# Patient Record
Sex: FEMALE | Race: WHITE | Employment: FULL TIME | URBAN - METROPOLITAN AREA
[De-identification: names, ages, dates, MRNs, and addresses within clinical notes are randomized per-mention and may not be internally consistent; named-entity substitution may affect disease eponyms.]

---

## 2017-03-16 ENCOUNTER — EMPLOYEE WELLNESS (OUTPATIENT)
Dept: OTHER | Age: 42
End: 2017-03-16

## 2017-03-16 LAB
CHOLESTEROL/HDL RATIO: 2.7
CHOLESTEROL: 173 MG/DL
GLUCOSE BLD-MCNC: 92 MG/DL (ref 70–99)
HDLC SERPL-MCNC: 64 MG/DL
LDL CHOLESTEROL: 94 MG/DL (ref 0–130)
TRIGL SERPL-MCNC: 73 MG/DL
VLDLC SERPL CALC-MCNC: NORMAL MG/DL (ref 1–30)

## 2017-08-31 ENCOUNTER — HOSPITAL ENCOUNTER (OUTPATIENT)
Dept: WOMENS IMAGING | Age: 42
Discharge: HOME | End: 2017-08-31
Payer: COMMERCIAL

## 2017-08-31 DIAGNOSIS — R92.8 ABNORMAL MAMMOGRAM OF RIGHT BREAST: ICD-10-CM

## 2017-08-31 PROCEDURE — G0206 DX MAMMO INCL CAD UNI: HCPCS

## 2018-02-09 PROBLEM — R10.13 DYSPEPSIA: Status: ACTIVE | Noted: 2018-02-09

## 2018-02-14 ENCOUNTER — ANESTHESIA EVENT (OUTPATIENT)
Dept: OPERATING ROOM | Age: 43
End: 2018-02-14
Payer: COMMERCIAL

## 2018-02-14 ENCOUNTER — ANESTHESIA (OUTPATIENT)
Dept: OPERATING ROOM | Age: 43
End: 2018-02-14
Payer: COMMERCIAL

## 2018-02-14 ENCOUNTER — HOSPITAL ENCOUNTER (OUTPATIENT)
Age: 43
Setting detail: OUTPATIENT SURGERY
Discharge: HOME | End: 2018-02-14
Attending: INTERNAL MEDICINE | Admitting: INTERNAL MEDICINE
Payer: COMMERCIAL

## 2018-02-14 VITALS
DIASTOLIC BLOOD PRESSURE: 89 MMHG | RESPIRATION RATE: 16 BRPM | SYSTOLIC BLOOD PRESSURE: 117 MMHG | OXYGEN SATURATION: 98 % | HEIGHT: 67 IN | BODY MASS INDEX: 21.97 KG/M2 | HEART RATE: 76 BPM | TEMPERATURE: 98.5 F | WEIGHT: 140 LBS

## 2018-02-14 VITALS
DIASTOLIC BLOOD PRESSURE: 73 MMHG | RESPIRATION RATE: 18 BRPM | OXYGEN SATURATION: 98 % | SYSTOLIC BLOOD PRESSURE: 95 MMHG

## 2018-02-14 PROCEDURE — 2500000003 HC RX 250 WO HCPCS: Performed by: NURSE ANESTHETIST, CERTIFIED REGISTERED

## 2018-02-14 PROCEDURE — 3700000000 HC ANESTHESIA ATTENDED CARE: Performed by: INTERNAL MEDICINE

## 2018-02-14 PROCEDURE — 2580000003 HC RX 258: Performed by: INTERNAL MEDICINE

## 2018-02-14 PROCEDURE — 43239 EGD BIOPSY SINGLE/MULTIPLE: CPT | Performed by: INTERNAL MEDICINE

## 2018-02-14 PROCEDURE — 88305 TISSUE EXAM BY PATHOLOGIST: CPT

## 2018-02-14 PROCEDURE — 7100000010 HC PHASE II RECOVERY - FIRST 15 MIN: Performed by: INTERNAL MEDICINE

## 2018-02-14 PROCEDURE — 7100000011 HC PHASE II RECOVERY - ADDTL 15 MIN: Performed by: INTERNAL MEDICINE

## 2018-02-14 PROCEDURE — 3609012400 HC EGD TRANSORAL BIOPSY SINGLE/MULTIPLE: Performed by: INTERNAL MEDICINE

## 2018-02-14 PROCEDURE — 6360000002 HC RX W HCPCS: Performed by: NURSE ANESTHETIST, CERTIFIED REGISTERED

## 2018-02-14 RX ORDER — SODIUM CHLORIDE, SODIUM LACTATE, POTASSIUM CHLORIDE, CALCIUM CHLORIDE 600; 310; 30; 20 MG/100ML; MG/100ML; MG/100ML; MG/100ML
INJECTION, SOLUTION INTRAVENOUS CONTINUOUS
Status: DISCONTINUED | OUTPATIENT
Start: 2018-02-14 | End: 2018-02-14 | Stop reason: HOSPADM

## 2018-02-14 RX ORDER — LIDOCAINE HYDROCHLORIDE 20 MG/ML
INJECTION, SOLUTION INFILTRATION; PERINEURAL PRN
Status: DISCONTINUED | OUTPATIENT
Start: 2018-02-14 | End: 2018-02-14 | Stop reason: SDUPTHER

## 2018-02-14 RX ORDER — PROPOFOL 10 MG/ML
INJECTION, EMULSION INTRAVENOUS PRN
Status: DISCONTINUED | OUTPATIENT
Start: 2018-02-14 | End: 2018-02-14 | Stop reason: SDUPTHER

## 2018-02-14 RX ADMIN — PROPOFOL 30 MG: 10 INJECTION, EMULSION INTRAVENOUS at 11:11

## 2018-02-14 RX ADMIN — SODIUM CHLORIDE, POTASSIUM CHLORIDE, SODIUM LACTATE AND CALCIUM CHLORIDE: 600; 310; 30; 20 INJECTION, SOLUTION INTRAVENOUS at 09:26

## 2018-02-14 RX ADMIN — LIDOCAINE HYDROCHLORIDE 100 MG: 20 INJECTION, SOLUTION INFILTRATION; PERINEURAL at 11:05

## 2018-02-14 RX ADMIN — PROPOFOL 30 MG: 10 INJECTION, EMULSION INTRAVENOUS at 11:07

## 2018-02-14 RX ADMIN — PROPOFOL 80 MG: 10 INJECTION, EMULSION INTRAVENOUS at 11:05

## 2018-02-14 ASSESSMENT — PAIN SCALES - GENERAL
PAINLEVEL_OUTOF10: 0

## 2018-02-14 ASSESSMENT — LIFESTYLE VARIABLES: SMOKING_STATUS: 0

## 2018-02-14 ASSESSMENT — PAIN - FUNCTIONAL ASSESSMENT: PAIN_FUNCTIONAL_ASSESSMENT: 0-10
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
## 2018-02-15 LAB — SURGICAL PATHOLOGY REPORT: NORMAL

## 2018-02-20 ENCOUNTER — TELEPHONE (OUTPATIENT)
Dept: GASTROENTEROLOGY | Age: 43
End: 2018-02-20

## 2018-03-01 ENCOUNTER — TELEPHONE (OUTPATIENT)
Dept: GASTROENTEROLOGY | Age: 43
End: 2018-03-01

## 2018-03-02 NOTE — TELEPHONE ENCOUNTER
I have no personal knowledge of either of these physicians, however, meeting with one of them to consider options would be reasonable. We can fill out whatever documentation is necessary for her insurance.

## 2018-03-07 NOTE — TELEPHONE ENCOUNTER
I informed Charles Sandoval that we have faxed the form and all records to [facility] to see about approval for her to see one of the surgeons at [facility]. (the doctors are affiliated with [facility])

## 2018-03-20 VITALS — WEIGHT: 138 LBS | BODY MASS INDEX: 21.61 KG/M2

## 2018-03-27 ENCOUNTER — TELEPHONE (OUTPATIENT)
Dept: GASTROENTEROLOGY | Age: 43
End: 2018-03-27

## 2018-03-27 DIAGNOSIS — K21.9 GASTROESOPHAGEAL REFLUX DISEASE WITHOUT ESOPHAGITIS: Primary | ICD-10-CM

## 2018-04-03 ENCOUNTER — EMPLOYEE WELLNESS (OUTPATIENT)
Dept: OTHER | Age: 43
End: 2018-04-03

## 2018-04-03 LAB
CHOLESTEROL/HDL RATIO: 2.7
CHOLESTEROL: 191 MG/DL
GLUCOSE BLD-MCNC: 88 MG/DL (ref 70–99)
HDLC SERPL-MCNC: 71 MG/DL
LDL CHOLESTEROL: 106 MG/DL (ref 0–130)
PATIENT FASTING?: YES
TRIGL SERPL-MCNC: 72 MG/DL
VLDLC SERPL CALC-MCNC: NORMAL MG/DL (ref 1–30)

## 2018-04-05 ENCOUNTER — OFFICE VISIT (OUTPATIENT)
Dept: SURGERY | Age: 43
End: 2018-04-05
Payer: COMMERCIAL

## 2018-04-05 VITALS
BODY MASS INDEX: 21.72 KG/M2 | HEART RATE: 73 BPM | HEIGHT: 67 IN | WEIGHT: 138.4 LBS | SYSTOLIC BLOOD PRESSURE: 118 MMHG | DIASTOLIC BLOOD PRESSURE: 77 MMHG

## 2018-04-05 DIAGNOSIS — K21.00 GASTROESOPHAGEAL REFLUX DISEASE WITH ESOPHAGITIS: ICD-10-CM

## 2018-04-05 DIAGNOSIS — R10.13 DYSPEPSIA: Primary | ICD-10-CM

## 2018-04-05 PROCEDURE — 99203 OFFICE O/P NEW LOW 30 MIN: CPT | Performed by: SURGERY

## 2018-04-05 PROCEDURE — 1036F TOBACCO NON-USER: CPT | Performed by: SURGERY

## 2018-04-05 PROCEDURE — G8427 DOCREV CUR MEDS BY ELIG CLIN: HCPCS | Performed by: SURGERY

## 2018-04-05 PROCEDURE — G8420 CALC BMI NORM PARAMETERS: HCPCS | Performed by: SURGERY

## 2018-04-09 PROBLEM — K21.00 GASTROESOPHAGEAL REFLUX DISEASE WITH ESOPHAGITIS: Status: ACTIVE | Noted: 2018-04-09

## 2018-04-10 VITALS — WEIGHT: 138 LBS | BODY MASS INDEX: 21.61 KG/M2

## 2018-04-19 ENCOUNTER — ANESTHESIA EVENT (OUTPATIENT)
Dept: ENDOSCOPY | Age: 43
End: 2018-04-19
Payer: COMMERCIAL

## 2018-04-19 ENCOUNTER — ANESTHESIA (OUTPATIENT)
Dept: ENDOSCOPY | Age: 43
End: 2018-04-19
Payer: COMMERCIAL

## 2018-04-19 ENCOUNTER — HOSPITAL ENCOUNTER (OUTPATIENT)
Age: 43
Setting detail: OUTPATIENT SURGERY
Discharge: HOME | End: 2018-04-19
Attending: INTERNAL MEDICINE | Admitting: INTERNAL MEDICINE
Payer: COMMERCIAL

## 2018-04-19 VITALS
OXYGEN SATURATION: 98 % | RESPIRATION RATE: 17 BRPM | SYSTOLIC BLOOD PRESSURE: 93 MMHG | DIASTOLIC BLOOD PRESSURE: 58 MMHG

## 2018-04-19 VITALS
SYSTOLIC BLOOD PRESSURE: 119 MMHG | HEIGHT: 67 IN | BODY MASS INDEX: 21.97 KG/M2 | RESPIRATION RATE: 20 BRPM | TEMPERATURE: 98.4 F | HEART RATE: 71 BPM | DIASTOLIC BLOOD PRESSURE: 82 MMHG | WEIGHT: 140 LBS | OXYGEN SATURATION: 100 %

## 2018-04-19 PROCEDURE — 2580000003 HC RX 258: Performed by: INTERNAL MEDICINE

## 2018-04-19 PROCEDURE — 2500000003 HC RX 250 WO HCPCS: Performed by: NURSE ANESTHETIST, CERTIFIED REGISTERED

## 2018-04-19 PROCEDURE — 7100000010 HC PHASE II RECOVERY - FIRST 15 MIN: Performed by: INTERNAL MEDICINE

## 2018-04-19 PROCEDURE — 88305 TISSUE EXAM BY PATHOLOGIST: CPT

## 2018-04-19 PROCEDURE — 3700000000 HC ANESTHESIA ATTENDED CARE: Performed by: INTERNAL MEDICINE

## 2018-04-19 PROCEDURE — 2580000003 HC RX 258: Performed by: NURSE ANESTHETIST, CERTIFIED REGISTERED

## 2018-04-19 PROCEDURE — 7100000011 HC PHASE II RECOVERY - ADDTL 15 MIN: Performed by: INTERNAL MEDICINE

## 2018-04-19 PROCEDURE — 84703 CHORIONIC GONADOTROPIN ASSAY: CPT

## 2018-04-19 PROCEDURE — 6360000002 HC RX W HCPCS: Performed by: NURSE ANESTHETIST, CERTIFIED REGISTERED

## 2018-04-19 PROCEDURE — 3700000001 HC ADD 15 MINUTES (ANESTHESIA): Performed by: INTERNAL MEDICINE

## 2018-04-19 PROCEDURE — 2780000010 HC IMPLANT OTHER: Performed by: INTERNAL MEDICINE

## 2018-04-19 PROCEDURE — 3609012400 HC EGD TRANSORAL BIOPSY SINGLE/MULTIPLE: Performed by: INTERNAL MEDICINE

## 2018-04-19 PROCEDURE — 3609019000 HC EGD CAPSULE ENDOSCOPY: Performed by: INTERNAL MEDICINE

## 2018-04-19 RX ORDER — PROPOFOL 10 MG/ML
INJECTION, EMULSION INTRAVENOUS PRN
Status: DISCONTINUED | OUTPATIENT
Start: 2018-04-19 | End: 2018-04-19 | Stop reason: SDUPTHER

## 2018-04-19 RX ORDER — SODIUM CHLORIDE 9 MG/ML
INJECTION, SOLUTION INTRAVENOUS CONTINUOUS
Status: DISCONTINUED | OUTPATIENT
Start: 2018-04-19 | End: 2018-04-19 | Stop reason: HOSPADM

## 2018-04-19 RX ORDER — SODIUM CHLORIDE 9 MG/ML
INJECTION, SOLUTION INTRAVENOUS CONTINUOUS PRN
Status: DISCONTINUED | OUTPATIENT
Start: 2018-04-19 | End: 2018-04-19 | Stop reason: SDUPTHER

## 2018-04-19 RX ORDER — LIDOCAINE HYDROCHLORIDE 10 MG/ML
INJECTION, SOLUTION INFILTRATION; PERINEURAL PRN
Status: DISCONTINUED | OUTPATIENT
Start: 2018-04-19 | End: 2018-04-19 | Stop reason: SDUPTHER

## 2018-04-19 RX ADMIN — PROPOFOL 200 MG: 10 INJECTION, EMULSION INTRAVENOUS at 11:28

## 2018-04-19 RX ADMIN — SODIUM CHLORIDE: 9 INJECTION, SOLUTION INTRAVENOUS at 11:25

## 2018-04-19 RX ADMIN — PROPOFOL 50 MG: 10 INJECTION, EMULSION INTRAVENOUS at 11:37

## 2018-04-19 RX ADMIN — PROPOFOL 50 MG: 10 INJECTION, EMULSION INTRAVENOUS at 11:31

## 2018-04-19 RX ADMIN — PROPOFOL 50 MG: 10 INJECTION, EMULSION INTRAVENOUS at 11:41

## 2018-04-19 RX ADMIN — SODIUM CHLORIDE: 9 INJECTION, SOLUTION INTRAVENOUS at 10:15

## 2018-04-19 RX ADMIN — PROPOFOL 50 MG: 10 INJECTION, EMULSION INTRAVENOUS at 11:34

## 2018-04-19 RX ADMIN — LIDOCAINE HYDROCHLORIDE 30 MG: 10 INJECTION, SOLUTION INFILTRATION; PERINEURAL at 11:25

## 2018-04-19 ASSESSMENT — PAIN - FUNCTIONAL ASSESSMENT: PAIN_FUNCTIONAL_ASSESSMENT: 0-10

## 2018-04-19 ASSESSMENT — PAIN SCALES - GENERAL
PAINLEVEL_OUTOF10: 0
PAINLEVEL_OUTOF10: 0

## 2018-04-19 ASSESSMENT — PAIN SCALES - WONG BAKER: WONGBAKER_NUMERICALRESPONSE: 0

## 2018-04-20 LAB — HCG, PREGNANCY URINE (POC): NEGATIVE

## 2018-04-21 LAB — SURGICAL PATHOLOGY REPORT: NORMAL

## 2018-05-17 ENCOUNTER — OFFICE VISIT (OUTPATIENT)
Dept: SURGERY | Age: 43
End: 2018-05-17
Payer: COMMERCIAL

## 2018-05-17 VITALS
WEIGHT: 136 LBS | HEIGHT: 67 IN | DIASTOLIC BLOOD PRESSURE: 83 MMHG | SYSTOLIC BLOOD PRESSURE: 127 MMHG | BODY MASS INDEX: 21.35 KG/M2 | HEART RATE: 85 BPM

## 2018-05-17 DIAGNOSIS — K21.00 HIATAL HERNIA WITH GASTROESOPHAGEAL REFLUX DISEASE AND ESOPHAGITIS: Primary | ICD-10-CM

## 2018-05-17 DIAGNOSIS — K44.9 HIATAL HERNIA WITH GASTROESOPHAGEAL REFLUX DISEASE AND ESOPHAGITIS: Primary | ICD-10-CM

## 2018-05-17 DIAGNOSIS — R10.13 EPIGASTRIC PAIN: ICD-10-CM

## 2018-05-17 PROCEDURE — 99213 OFFICE O/P EST LOW 20 MIN: CPT | Performed by: SURGERY
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
## 2018-05-18 ENCOUNTER — TELEPHONE (OUTPATIENT)
Dept: SURGERY | Age: 43
End: 2018-05-18

## 2018-05-21 ENCOUNTER — HOSPITAL ENCOUNTER (OUTPATIENT)
Dept: ULTRASOUND IMAGING | Age: 43
Discharge: HOME | End: 2018-05-23
Payer: COMMERCIAL

## 2018-05-21 ENCOUNTER — HOSPITAL ENCOUNTER (OUTPATIENT)
Dept: PHYSICAL THERAPY | Age: 43
Setting detail: THERAPIES SERIES
Discharge: HOME | End: 2018-05-21
Payer: COMMERCIAL

## 2018-05-21 DIAGNOSIS — R10.13 EPIGASTRIC PAIN: ICD-10-CM

## 2018-05-21 PROCEDURE — 76705 ECHO EXAM OF ABDOMEN: CPT

## 2018-05-21 PROCEDURE — 97760 ORTHOTIC MGMT&TRAING 1ST ENC: CPT

## 2018-05-21 PROCEDURE — L3020 FOOT LONGITUD/METATARSAL SUP: HCPCS

## 2018-05-23 ENCOUNTER — INITIAL CONSULT (OUTPATIENT)
Dept: VASCULAR SURGERY | Age: 43
End: 2018-05-23
Payer: COMMERCIAL

## 2018-05-23 VITALS
DIASTOLIC BLOOD PRESSURE: 75 MMHG | BODY MASS INDEX: 21.3 KG/M2 | RESPIRATION RATE: 17 BRPM | HEART RATE: 62 BPM | SYSTOLIC BLOOD PRESSURE: 115 MMHG | WEIGHT: 136 LBS

## 2018-05-23 DIAGNOSIS — I83.891 VARICOSE VEINS OF LEG WITH EDEMA, RIGHT: Primary | ICD-10-CM

## 2018-05-23 PROCEDURE — G8427 DOCREV CUR MEDS BY ELIG CLIN: HCPCS | Performed by: INTERNAL MEDICINE

## 2018-05-23 PROCEDURE — G8420 CALC BMI NORM PARAMETERS: HCPCS | Performed by: INTERNAL MEDICINE

## 2018-05-23 PROCEDURE — 1036F TOBACCO NON-USER: CPT | Performed by: INTERNAL MEDICINE

## 2018-05-23 PROCEDURE — 99203 OFFICE O/P NEW LOW 30 MIN: CPT | Performed by: INTERNAL MEDICINE

## 2018-06-06 ENCOUNTER — HOSPITAL ENCOUNTER (OUTPATIENT)
Dept: VASCULAR LAB | Age: 43
Discharge: HOME | End: 2018-06-08
Payer: COMMERCIAL

## 2018-06-06 DIAGNOSIS — I83.891 VARICOSE VEINS OF LEG WITH EDEMA, RIGHT: ICD-10-CM

## 2018-06-06 PROCEDURE — 93971 EXTREMITY STUDY: CPT

## 2018-06-11 ENCOUNTER — HOSPITAL ENCOUNTER (OUTPATIENT)
Dept: PHYSICAL THERAPY | Age: 43
Setting detail: THERAPIES SERIES
Discharge: HOME | End: 2018-06-11
Payer: COMMERCIAL

## 2018-07-24 ENCOUNTER — HOSPITAL ENCOUNTER (OUTPATIENT)
Age: 43
Setting detail: SPECIMEN
Discharge: HOME | End: 2018-07-24
Payer: COMMERCIAL

## 2018-07-24 ENCOUNTER — OFFICE VISIT (OUTPATIENT)
Dept: PRIMARY CARE CLINIC | Age: 43
End: 2018-07-24
Payer: COMMERCIAL

## 2018-07-24 VITALS
DIASTOLIC BLOOD PRESSURE: 74 MMHG | RESPIRATION RATE: 18 BRPM | BODY MASS INDEX: 21.38 KG/M2 | HEART RATE: 82 BPM | SYSTOLIC BLOOD PRESSURE: 128 MMHG | TEMPERATURE: 98.8 F | WEIGHT: 136.5 LBS

## 2018-07-24 DIAGNOSIS — L03.116 CELLULITIS OF LEFT LOWER EXTREMITY: Primary | ICD-10-CM

## 2018-07-24 DIAGNOSIS — Z86.718 HISTORY OF DVT OF LOWER EXTREMITY: ICD-10-CM

## 2018-07-24 DIAGNOSIS — T63.691A MARINE ANIMAL STING, ACCIDENTAL OR UNINTENTIONAL, INITIAL ENCOUNTER: ICD-10-CM

## 2018-07-24 PROCEDURE — 87205 SMEAR GRAM STAIN: CPT

## 2018-07-24 PROCEDURE — 99213 OFFICE O/P EST LOW 20 MIN: CPT | Performed by: NURSE PRACTITIONER

## 2018-07-24 PROCEDURE — 87070 CULTURE OTHR SPECIMN AEROBIC: CPT

## 2018-07-24 RX ORDER — MUPIROCIN CALCIUM 20 MG/G
CREAM TOPICAL
Qty: 1 TUBE | Refills: 0 | Status: SHIPPED | OUTPATIENT
Start: 2018-07-24 | End: 2018-07-25

## 2018-07-24 RX ORDER — CEPHALEXIN 500 MG/1
500 CAPSULE ORAL 4 TIMES DAILY
Qty: 28 CAPSULE | Refills: 0 | Status: SHIPPED | OUTPATIENT
Start: 2018-07-24 | End: 2018-12-26

## 2018-07-24 NOTE — PROGRESS NOTES
[facility] PHYSICIANS  [facility]  [address] [address]  Dept: [phone]  Dept Fax: [phone]    Ladan Rodriguez is a 43 y.o. female who presents to the [facility] in Care today for her medical conditions/complaints as noted below. Ladan Rodriguez is c/o of Abrasion (left thigh)      HPI:     70-year-old female presents to the walk-in clinic for complaints of abrasion to her left thigh while on vacation in [place] 10 days ago. Patient verbalizes she has been putting a loose bandage on it noticing a small amount of yellowish drainage. Patient verbalizes she is up-to-date on her tetanus vaccination. Patient verbalizes she has a bruise to her left thigh. Patient has a history of a DVT. Patient denies left thigh swelling, or pain. Also verbalizes she was stung by a stingray to her left inner ankle. Other   Chronicity: Abrasion: Fell approx 10 days ago while on vacation fell off sand ledge and was stung by stingray. Abrasion on left thigh with yellowish drainage and area on left foot rddened with dark center. Associated symptoms include a rash. Pertinent negatives include no fever or headaches. Nothing aggravates the symptoms. She has tried nothing for the symptoms. Past Medical History:   Diagnosis Date    Acute superficial venous thrombosis of lower extremity     postpartum    Environmental allergies     GERD (gastroesophageal reflux disease)     History of varicose veins     Reflux esophagitis         Current Outpatient Prescriptions   Medication Sig Dispense Refill    mupirocin (BACTROBAN) 2 % ointment Apply 3 times daily.  1 Tube 0    cephALEXin (KEFLEX) 500 MG capsule Take 1 capsule by mouth 4 times daily for 7 days 28 capsule 0    celecoxib (CELEBREX) 200 MG capsule Take 1 capsule by mouth daily 30 capsule 3    omeprazole (PRILOSEC) 40 MG delayed release capsule Take 1 capsule by mouth 2 times daily 180 capsule 3     No current facility-administered medications for this visit. No Known Allergies    Subjective:      Review of Systems   Constitutional: Negative. Negative for activity change, appetite change and fever. Respiratory: Negative. Cardiovascular: Negative. Musculoskeletal: Negative. Skin: Positive for rash and wound. Left thigh, left inner ankle   Neurological: Negative. Negative for headaches. Objective:     Physical Exam   Constitutional: She is oriented to person, place, and time. Vital signs are normal. She appears well-developed and well-nourished. She is cooperative. Non-toxic appearance. She does not appear ill. No distress. Appears well hydrated and non toxic. Sitting upright in chair without distress. Respirations are regular, non labored and quiet. HENT:   Head: Normocephalic and atraumatic. Nose: Nose normal.   Mouth/Throat: Oropharynx is clear and moist.   Eyes: EOM are normal. Pupils are equal, round, and reactive to light. Neck: Normal range of motion. Neck supple. Cardiovascular: Normal rate, regular rhythm, normal heart sounds and intact distal pulses. Exam reveals no gallop and no friction rub. No murmur heard. Pulses:       Radial pulses are 2+ on the left side. Dorsalis pedis pulses are 2+ on the left side. Posterior tibial pulses are 2+ on the left side. Pulmonary/Chest: Effort normal and breath sounds normal. No respiratory distress. She has no decreased breath sounds. She has no wheezes. She has no rhonchi. She has no rales. Musculoskeletal: Normal range of motion. Neurological: She is alert and oriented to person, place, and time. She exhibits normal muscle tone. Skin: Skin is warm and dry. Abrasion and bruising noted. No rash noted. There is erythema. Abrasion to left thigh no induration, no fluctuance   Psychiatric: She has a normal mood and affect.  Her speech is normal and behavior is normal. Judgment and thought content normal.   Nursing note and vitals reviewed. /74 (Site: Left Arm, Position: Sitting, Cuff Size: Medium Adult)   Pulse 82   Temp 98.8 °F (37.1 °C) (Temporal)   Resp 18   Wt 136 lb 8 oz (61.9 kg)   BMI 21.38 kg/m²     Assessment:      Diagnosis Orders   1. Cellulitis of left lower extremity  Wound Culture    cephALEXin (KEFLEX) 500 MG capsule    mupirocin (BACTROBAN) 2 % ointment    DISCONTINUED: mupirocin (BACTROBAN) 2 % cream   2. Marine animal sting, accidental or unintentional, initial encounter  mupirocin (BACTROBAN) 2 % ointment   3. History of DVT of lower extremity         Plan:   1. Cellulitis to the left lateral thigh: Culture obtained. Will send a prescription for Keflex and mupirocin. No signs of abscess. No need for MRSA coverage today in office. Informed patient to keep area clean and dry. 2.  Stingray accident: Apply mupirocin topically. Return to office with any signs of infection. 3. Bruising to left thigh, due to patient's history of a DVT Doppler study offered to patient. Patient refuses Doppler scan today. Instructed patient if she has any worsening of symptoms signs of a DVT she needs to see her PCP immediately. Patient verbalizes understanding. Discussed exam, POCT findings, plan of care (including prescriptive and supportive as listed below) and follow-up at length with patient and or Patient. Reviewed all prescribed and recommended medications, administration and side effects. Encouraged to return to [address] [place] [address] for no improvement and or worsening of symptoms. To ER or call 911 if any difficulty breathing, shortness of breath, inability to swallow, hives or temp greater than 103 degrees. Questions answered. They verbalized understanding and agreement. Return in about 2 days (around 7/26/2018) for with PCP.     Orders Placed This Encounter   Medications    cephALEXin (KEFLEX) 500 MG capsule     Sig: Take 1 capsule by mouth 4 times daily for

## 2018-07-25 ENCOUNTER — TELEPHONE (OUTPATIENT)
Dept: PRIMARY CARE CLINIC | Age: 43
End: 2018-07-25

## 2018-07-25 ASSESSMENT — ENCOUNTER SYMPTOMS: RESPIRATORY NEGATIVE: 1

## 2018-07-25 NOTE — TELEPHONE ENCOUNTER
----- Message from LOPEZ Garces CNP sent at 7/25/2018  4:44 PM EDT -----  No growth. Please relate to patient/parent. Thank you.    Manual Danger

## 2018-07-26 LAB
CULTURE: NO GROWTH
DIRECT EXAM: NORMAL
DIRECT EXAM: NORMAL
Lab: NORMAL
SPECIMEN DESCRIPTION: NORMAL
STATUS: NORMAL

## 2018-09-12 ENCOUNTER — INITIAL CONSULT (OUTPATIENT)
Dept: BARIATRICS/WEIGHT MGMT | Age: 43
End: 2018-09-12
Payer: COMMERCIAL

## 2018-09-12 VITALS
BODY MASS INDEX: 21.52 KG/M2 | SYSTOLIC BLOOD PRESSURE: 112 MMHG | HEIGHT: 67 IN | WEIGHT: 137.1 LBS | RESPIRATION RATE: 20 BRPM | HEART RATE: 74 BPM | DIASTOLIC BLOOD PRESSURE: 72 MMHG

## 2018-09-12 DIAGNOSIS — K21.00 GASTROESOPHAGEAL REFLUX DISEASE WITH ESOPHAGITIS: Primary | ICD-10-CM

## 2018-09-12 PROCEDURE — 99204 OFFICE O/P NEW MOD 45 MIN: CPT | Performed by: SURGERY

## 2018-09-19 ENCOUNTER — TELEPHONE (OUTPATIENT)
Dept: BARIATRICS/WEIGHT MGMT | Age: 43
End: 2018-09-19

## 2018-10-26 ENCOUNTER — ANESTHESIA EVENT (OUTPATIENT)
Dept: OPERATING ROOM | Age: 43
DRG: 328 | End: 2018-10-26
Payer: COMMERCIAL

## 2018-10-29 ENCOUNTER — HOSPITAL ENCOUNTER (OUTPATIENT)
Age: 43
Setting detail: OBSERVATION
Discharge: HOME | DRG: 328 | End: 2018-10-29
Attending: SURGERY | Admitting: SURGERY
Payer: COMMERCIAL

## 2018-10-29 ENCOUNTER — ANESTHESIA (OUTPATIENT)
Dept: OPERATING ROOM | Age: 43
DRG: 328 | End: 2018-10-29
Payer: COMMERCIAL

## 2018-10-29 VITALS
BODY MASS INDEX: 21.5 KG/M2 | HEART RATE: 62 BPM | TEMPERATURE: 98.1 F | RESPIRATION RATE: 18 BRPM | SYSTOLIC BLOOD PRESSURE: 110 MMHG | OXYGEN SATURATION: 99 % | DIASTOLIC BLOOD PRESSURE: 81 MMHG | HEIGHT: 67 IN | WEIGHT: 137 LBS

## 2018-10-29 VITALS — DIASTOLIC BLOOD PRESSURE: 72 MMHG | SYSTOLIC BLOOD PRESSURE: 109 MMHG | TEMPERATURE: 98.3 F | OXYGEN SATURATION: 98 %

## 2018-10-29 DIAGNOSIS — G89.18 PAIN ASSOCIATED WITH SURGICAL PROCEDURE: Primary | ICD-10-CM

## 2018-10-29 PROBLEM — K21.9 HIATAL HERNIA WITH GERD: Status: ACTIVE | Noted: 2018-10-29

## 2018-10-29 PROBLEM — K44.9 HIATAL HERNIA WITH GERD: Status: ACTIVE | Noted: 2018-10-29

## 2018-10-29 LAB
GFR NON-AFRICAN AMERICAN: >60 ML/MIN
GFR SERPL CREATININE-BSD FRML MDRD: >60 ML/MIN
GFR SERPL CREATININE-BSD FRML MDRD: NORMAL ML/MIN/{1.73_M2}
GLUCOSE BLD-MCNC: 88 MG/DL (ref 74–100)
POC CHLORIDE: 107 MMOL/L (ref 98–107)
POC CREATININE: 0.96 MG/DL (ref 0.51–1.19)
POC INR: 1
POC IONIZED CALCIUM: 1.21 MMOL/L (ref 1.15–1.33)
POC LACTIC ACID: 0.61 MMOL/L (ref 0.56–1.39)
POC POTASSIUM: 5 MMOL/L (ref 3.5–4.5)
POC SODIUM: 142 MMOL/L (ref 138–146)
PROTHROMBIN TIME, POC: 11.5 SEC (ref 10.4–14.2)

## 2018-10-29 PROCEDURE — G0378 HOSPITAL OBSERVATION PER HR: HCPCS

## 2018-10-29 PROCEDURE — S2900 ROBOTIC SURGICAL SYSTEM: HCPCS | Performed by: SURGERY

## 2018-10-29 PROCEDURE — 82435 ASSAY OF BLOOD CHLORIDE: CPT

## 2018-10-29 PROCEDURE — 2580000003 HC RX 258: Performed by: ANESTHESIOLOGY

## 2018-10-29 PROCEDURE — 2500000003 HC RX 250 WO HCPCS: Performed by: SURGERY

## 2018-10-29 PROCEDURE — 2580000003 HC RX 258: Performed by: SURGERY

## 2018-10-29 PROCEDURE — 3600000009 HC SURGERY ROBOT BASE: Performed by: SURGERY

## 2018-10-29 PROCEDURE — 6360000002 HC RX W HCPCS: Performed by: SURGERY

## 2018-10-29 PROCEDURE — 2709999900 HC NON-CHARGEABLE SUPPLY: Performed by: SURGERY

## 2018-10-29 PROCEDURE — 43281 LAP PARAESOPHAG HERN REPAIR: CPT | Performed by: SURGERY

## 2018-10-29 PROCEDURE — 85610 PROTHROMBIN TIME: CPT

## 2018-10-29 PROCEDURE — 7100000001 HC PACU RECOVERY - ADDTL 15 MIN: Performed by: SURGERY

## 2018-10-29 PROCEDURE — 84295 ASSAY OF SERUM SODIUM: CPT

## 2018-10-29 PROCEDURE — 6370000000 HC RX 637 (ALT 250 FOR IP): Performed by: ANESTHESIOLOGY

## 2018-10-29 PROCEDURE — 82565 ASSAY OF CREATININE: CPT

## 2018-10-29 PROCEDURE — 82947 ASSAY GLUCOSE BLOOD QUANT: CPT

## 2018-10-29 PROCEDURE — 3700000001 HC ADD 15 MINUTES (ANESTHESIA): Performed by: SURGERY

## 2018-10-29 PROCEDURE — C1768 GRAFT, VASCULAR: HCPCS | Performed by: SURGERY

## 2018-10-29 PROCEDURE — 82330 ASSAY OF CALCIUM: CPT

## 2018-10-29 PROCEDURE — 7100000000 HC PACU RECOVERY - FIRST 15 MIN: Performed by: SURGERY

## 2018-10-29 PROCEDURE — 83605 ASSAY OF LACTIC ACID: CPT

## 2018-10-29 PROCEDURE — 1200000000 HC SEMI PRIVATE

## 2018-10-29 PROCEDURE — 2500000003 HC RX 250 WO HCPCS: Performed by: NURSE ANESTHETIST, CERTIFIED REGISTERED

## 2018-10-29 PROCEDURE — 6360000002 HC RX W HCPCS: Performed by: NURSE ANESTHETIST, CERTIFIED REGISTERED

## 2018-10-29 PROCEDURE — S0028 INJECTION, FAMOTIDINE, 20 MG: HCPCS | Performed by: SURGERY

## 2018-10-29 PROCEDURE — 3600000019 HC SURGERY ROBOT ADDTL 15MIN: Performed by: SURGERY

## 2018-10-29 PROCEDURE — 3700000000 HC ANESTHESIA ATTENDED CARE: Performed by: SURGERY

## 2018-10-29 PROCEDURE — 84132 ASSAY OF SERUM POTASSIUM: CPT

## 2018-10-29 DEVICE — BARD® PTFE FELT, 2.5 CM X 15.2 CM
Type: IMPLANTABLE DEVICE | Status: FUNCTIONAL
Brand: BARD® PTFE FELT

## 2018-10-29 RX ORDER — SODIUM CHLORIDE 9 MG/ML
INJECTION, SOLUTION INTRAVENOUS CONTINUOUS
Status: DISCONTINUED | OUTPATIENT
Start: 2018-10-29 | End: 2018-10-29 | Stop reason: HOSPADM

## 2018-10-29 RX ORDER — SODIUM CHLORIDE 0.9 % (FLUSH) 0.9 %
10 SYRINGE (ML) INJECTION PRN
Status: DISCONTINUED | OUTPATIENT
Start: 2018-10-29 | End: 2018-10-29 | Stop reason: HOSPADM

## 2018-10-29 RX ORDER — GLYCOPYRROLATE 1 MG/5 ML
SYRINGE (ML) INTRAVENOUS PRN
Status: DISCONTINUED | OUTPATIENT
Start: 2018-10-29 | End: 2018-10-29 | Stop reason: SDUPTHER

## 2018-10-29 RX ORDER — MIDAZOLAM HYDROCHLORIDE 1 MG/ML
1 INJECTION INTRAMUSCULAR; INTRAVENOUS
Status: CANCELLED | OUTPATIENT
Start: 2018-10-29 | End: 2018-10-29

## 2018-10-29 RX ORDER — FENTANYL CITRATE 50 UG/ML
25 INJECTION, SOLUTION INTRAMUSCULAR; INTRAVENOUS EVERY 5 MIN PRN
Status: DISCONTINUED | OUTPATIENT
Start: 2018-10-29 | End: 2018-10-29 | Stop reason: HOSPADM

## 2018-10-29 RX ORDER — HEPARIN SODIUM 5000 [USP'U]/ML
5000 INJECTION, SOLUTION INTRAVENOUS; SUBCUTANEOUS ONCE
Status: COMPLETED | OUTPATIENT
Start: 2018-10-29 | End: 2018-10-29

## 2018-10-29 RX ORDER — OXYCODONE HCL 5 MG/5 ML
5 SOLUTION, ORAL ORAL EVERY 4 HOURS PRN
Qty: 140 ML | Refills: 0 | Status: SHIPPED | OUTPATIENT
Start: 2018-10-29 | End: 2018-12-26

## 2018-10-29 RX ORDER — BUPIVACAINE HYDROCHLORIDE AND EPINEPHRINE 5; 5 MG/ML; UG/ML
INJECTION, SOLUTION EPIDURAL; INTRACAUDAL; PERINEURAL PRN
Status: DISCONTINUED | OUTPATIENT
Start: 2018-10-29 | End: 2018-10-29 | Stop reason: HOSPADM

## 2018-10-29 RX ORDER — PROMETHAZINE HYDROCHLORIDE 25 MG/1
25 SUPPOSITORY RECTAL EVERY 6 HOURS PRN
Status: DISCONTINUED | OUTPATIENT
Start: 2018-10-29 | End: 2018-10-29 | Stop reason: HOSPADM

## 2018-10-29 RX ORDER — LIDOCAINE HYDROCHLORIDE 10 MG/ML
INJECTION, SOLUTION EPIDURAL; INFILTRATION; INTRACAUDAL; PERINEURAL PRN
Status: DISCONTINUED | OUTPATIENT
Start: 2018-10-29 | End: 2018-10-29 | Stop reason: SDUPTHER

## 2018-10-29 RX ORDER — DIPHENHYDRAMINE HYDROCHLORIDE 50 MG/ML
INJECTION INTRAMUSCULAR; INTRAVENOUS PRN
Status: DISCONTINUED | OUTPATIENT
Start: 2018-10-29 | End: 2018-10-29 | Stop reason: SDUPTHER

## 2018-10-29 RX ORDER — FENTANYL CITRATE 50 UG/ML
INJECTION, SOLUTION INTRAMUSCULAR; INTRAVENOUS PRN
Status: DISCONTINUED | OUTPATIENT
Start: 2018-10-29 | End: 2018-10-29 | Stop reason: SDUPTHER

## 2018-10-29 RX ORDER — ONDANSETRON 4 MG/1
4 TABLET, ORALLY DISINTEGRATING ORAL EVERY 8 HOURS PRN
Qty: 25 TABLET | Refills: 0 | Status: SHIPPED | OUTPATIENT
Start: 2018-10-29 | End: 2020-02-25 | Stop reason: SDUPTHER

## 2018-10-29 RX ORDER — LABETALOL HYDROCHLORIDE 5 MG/ML
5 INJECTION, SOLUTION INTRAVENOUS EVERY 10 MIN PRN
Status: DISCONTINUED | OUTPATIENT
Start: 2018-10-29 | End: 2018-10-29 | Stop reason: HOSPADM

## 2018-10-29 RX ORDER — KETOROLAC TROMETHAMINE 30 MG/ML
30 INJECTION, SOLUTION INTRAMUSCULAR; INTRAVENOUS EVERY 6 HOURS
Status: DISCONTINUED | OUTPATIENT
Start: 2018-10-29 | End: 2018-10-29 | Stop reason: HOSPADM

## 2018-10-29 RX ORDER — SODIUM CHLORIDE 0.9 % (FLUSH) 0.9 %
10 SYRINGE (ML) INJECTION EVERY 12 HOURS SCHEDULED
Status: DISCONTINUED | OUTPATIENT
Start: 2018-10-29 | End: 2018-10-29 | Stop reason: HOSPADM

## 2018-10-29 RX ORDER — NEOSTIGMINE METHYLSULFATE 5 MG/5 ML
SYRINGE (ML) INTRAVENOUS PRN
Status: DISCONTINUED | OUTPATIENT
Start: 2018-10-29 | End: 2018-10-29 | Stop reason: SDUPTHER

## 2018-10-29 RX ORDER — PROPOFOL 10 MG/ML
INJECTION, EMULSION INTRAVENOUS PRN
Status: DISCONTINUED | OUTPATIENT
Start: 2018-10-29 | End: 2018-10-29 | Stop reason: SDUPTHER

## 2018-10-29 RX ORDER — SODIUM CHLORIDE, SODIUM LACTATE, POTASSIUM CHLORIDE, CALCIUM CHLORIDE 600; 310; 30; 20 MG/100ML; MG/100ML; MG/100ML; MG/100ML
INJECTION, SOLUTION INTRAVENOUS CONTINUOUS
Status: DISCONTINUED | OUTPATIENT
Start: 2018-10-29 | End: 2018-10-29

## 2018-10-29 RX ORDER — ONDANSETRON 2 MG/ML
4 INJECTION INTRAMUSCULAR; INTRAVENOUS
Status: DISCONTINUED | OUTPATIENT
Start: 2018-10-29 | End: 2018-10-29 | Stop reason: HOSPADM

## 2018-10-29 RX ORDER — ONDANSETRON 2 MG/ML
4 INJECTION INTRAMUSCULAR; INTRAVENOUS EVERY 4 HOURS PRN
Status: DISCONTINUED | OUTPATIENT
Start: 2018-10-29 | End: 2018-10-29 | Stop reason: HOSPADM

## 2018-10-29 RX ORDER — SCOLOPAMINE TRANSDERMAL SYSTEM 1 MG/1
1 PATCH, EXTENDED RELEASE TRANSDERMAL ONCE
Status: DISCONTINUED | OUTPATIENT
Start: 2018-10-29 | End: 2018-10-29 | Stop reason: HOSPADM

## 2018-10-29 RX ORDER — OXYCODONE HCL 5 MG/5 ML
5 SOLUTION, ORAL ORAL EVERY 4 HOURS PRN
Status: DISCONTINUED | OUTPATIENT
Start: 2018-10-29 | End: 2018-10-29 | Stop reason: HOSPADM

## 2018-10-29 RX ORDER — ROCURONIUM BROMIDE 10 MG/ML
INJECTION, SOLUTION INTRAVENOUS PRN
Status: DISCONTINUED | OUTPATIENT
Start: 2018-10-29 | End: 2018-10-29 | Stop reason: SDUPTHER

## 2018-10-29 RX ORDER — MAGNESIUM HYDROXIDE 1200 MG/15ML
LIQUID ORAL CONTINUOUS PRN
Status: COMPLETED | OUTPATIENT
Start: 2018-10-29 | End: 2018-10-29

## 2018-10-29 RX ORDER — ONDANSETRON 2 MG/ML
INJECTION INTRAMUSCULAR; INTRAVENOUS PRN
Status: DISCONTINUED | OUTPATIENT
Start: 2018-10-29 | End: 2018-10-29 | Stop reason: SDUPTHER

## 2018-10-29 RX ORDER — ACETAMINOPHEN 10 MG/ML
INJECTION, SOLUTION INTRAVENOUS PRN
Status: DISCONTINUED | OUTPATIENT
Start: 2018-10-29 | End: 2018-10-29 | Stop reason: SDUPTHER

## 2018-10-29 RX ORDER — DEXAMETHASONE SODIUM PHOSPHATE 10 MG/ML
INJECTION INTRAMUSCULAR; INTRAVENOUS PRN
Status: DISCONTINUED | OUTPATIENT
Start: 2018-10-29 | End: 2018-10-29 | Stop reason: SDUPTHER

## 2018-10-29 RX ORDER — LIDOCAINE HYDROCHLORIDE 10 MG/ML
1 INJECTION, SOLUTION EPIDURAL; INFILTRATION; INTRACAUDAL; PERINEURAL
Status: DISCONTINUED | OUTPATIENT
Start: 2018-10-29 | End: 2018-10-29 | Stop reason: HOSPADM

## 2018-10-29 RX ADMIN — ROCURONIUM BROMIDE 10 MG: 10 INJECTION INTRAVENOUS at 08:20

## 2018-10-29 RX ADMIN — DEXAMETHASONE SODIUM PHOSPHATE 10 MG: 10 INJECTION INTRAMUSCULAR; INTRAVENOUS at 07:41

## 2018-10-29 RX ADMIN — FAMOTIDINE 20 MG: 10 INJECTION, SOLUTION INTRAVENOUS at 12:22

## 2018-10-29 RX ADMIN — PHENYLEPHRINE HYDROCHLORIDE 100 MCG: 10 INJECTION INTRAVENOUS at 08:24

## 2018-10-29 RX ADMIN — Medication 2 G: at 07:45

## 2018-10-29 RX ADMIN — FENTANYL CITRATE 50 MCG: 50 INJECTION INTRAMUSCULAR; INTRAVENOUS at 08:42

## 2018-10-29 RX ADMIN — SODIUM CHLORIDE, POTASSIUM CHLORIDE, SODIUM LACTATE AND CALCIUM CHLORIDE: 600; 310; 30; 20 INJECTION, SOLUTION INTRAVENOUS at 08:18

## 2018-10-29 RX ADMIN — KETOROLAC TROMETHAMINE 30 MG: 30 INJECTION, SOLUTION INTRAMUSCULAR at 12:22

## 2018-10-29 RX ADMIN — FENTANYL CITRATE 50 MCG: 50 INJECTION INTRAMUSCULAR; INTRAVENOUS at 08:28

## 2018-10-29 RX ADMIN — HEPARIN SODIUM 5000 UNITS: 5000 INJECTION, SOLUTION INTRAVENOUS; SUBCUTANEOUS at 06:58

## 2018-10-29 RX ADMIN — FENTANYL CITRATE 50 MCG: 50 INJECTION INTRAMUSCULAR; INTRAVENOUS at 07:35

## 2018-10-29 RX ADMIN — Medication 0.6 MG: at 09:04

## 2018-10-29 RX ADMIN — FENTANYL CITRATE 50 MCG: 50 INJECTION INTRAMUSCULAR; INTRAVENOUS at 09:23

## 2018-10-29 RX ADMIN — ONDANSETRON 4 MG: 2 INJECTION, SOLUTION INTRAMUSCULAR; INTRAVENOUS at 09:08

## 2018-10-29 RX ADMIN — ROCURONIUM BROMIDE 50 MG: 10 INJECTION INTRAVENOUS at 07:35

## 2018-10-29 RX ADMIN — FENTANYL CITRATE 50 MCG: 50 INJECTION INTRAMUSCULAR; INTRAVENOUS at 07:55

## 2018-10-29 RX ADMIN — PROPOFOL 150 MG: 10 INJECTION, EMULSION INTRAVENOUS at 07:35

## 2018-10-29 RX ADMIN — PHENYLEPHRINE HYDROCHLORIDE 200 MCG: 10 INJECTION INTRAVENOUS at 08:08

## 2018-10-29 RX ADMIN — SODIUM CHLORIDE: 9 INJECTION, SOLUTION INTRAVENOUS at 10:38

## 2018-10-29 RX ADMIN — LIDOCAINE HYDROCHLORIDE 50 MG: 10 INJECTION, SOLUTION EPIDURAL; INFILTRATION; INTRACAUDAL; PERINEURAL at 07:35

## 2018-10-29 RX ADMIN — ONDANSETRON 4 MG: 2 INJECTION, SOLUTION INTRAMUSCULAR; INTRAVENOUS at 07:41

## 2018-10-29 RX ADMIN — ACETAMINOPHEN 1000 MG: 10 INJECTION, SOLUTION INTRAVENOUS at 07:49

## 2018-10-29 RX ADMIN — SODIUM CHLORIDE, POTASSIUM CHLORIDE, SODIUM LACTATE AND CALCIUM CHLORIDE: 600; 310; 30; 20 INJECTION, SOLUTION INTRAVENOUS at 06:55

## 2018-10-29 RX ADMIN — Medication 3 MG: at 09:04

## 2018-10-29 RX ADMIN — Medication 12.5 MG: at 07:41

## 2018-10-29 RX ADMIN — PHENYLEPHRINE HYDROCHLORIDE 100 MCG: 10 INJECTION INTRAVENOUS at 08:21

## 2018-10-29 RX ADMIN — ROCURONIUM BROMIDE 10 MG: 10 INJECTION INTRAVENOUS at 08:11

## 2018-10-29 ASSESSMENT — PULMONARY FUNCTION TESTS
PIF_VALUE: 29
PIF_VALUE: 5
PIF_VALUE: 27
PIF_VALUE: 15
PIF_VALUE: 37
PIF_VALUE: 25
PIF_VALUE: 20
PIF_VALUE: 32
PIF_VALUE: 14
PIF_VALUE: 15
PIF_VALUE: 0
PIF_VALUE: 17
PIF_VALUE: 29
PIF_VALUE: 25
PIF_VALUE: 27
PIF_VALUE: 26
PIF_VALUE: 14
PIF_VALUE: 20
PIF_VALUE: 21
PIF_VALUE: 1
PIF_VALUE: 14
PIF_VALUE: 15
PIF_VALUE: 23
PIF_VALUE: 28
PIF_VALUE: 28
PIF_VALUE: 14
PIF_VALUE: 28
PIF_VALUE: 31
PIF_VALUE: 5
PIF_VALUE: 20
PIF_VALUE: 28
PIF_VALUE: 4
PIF_VALUE: 30
PIF_VALUE: 21
PIF_VALUE: 29
PIF_VALUE: 30
PIF_VALUE: 2
PIF_VALUE: 28
PIF_VALUE: 26
PIF_VALUE: 14
PIF_VALUE: 27
PIF_VALUE: 28
PIF_VALUE: 20
PIF_VALUE: 26
PIF_VALUE: 33
PIF_VALUE: 11
PIF_VALUE: 20
PIF_VALUE: 19
PIF_VALUE: 30
PIF_VALUE: 23
PIF_VALUE: 27
PIF_VALUE: 24
PIF_VALUE: 23
PIF_VALUE: 16
PIF_VALUE: 29
PIF_VALUE: 27
PIF_VALUE: 30
PIF_VALUE: 28
PIF_VALUE: 19
PIF_VALUE: 29
PIF_VALUE: 23
PIF_VALUE: 28
PIF_VALUE: 1
PIF_VALUE: 1
PIF_VALUE: 27
PIF_VALUE: 30
PIF_VALUE: 15
PIF_VALUE: 31
PIF_VALUE: 19
PIF_VALUE: 18
PIF_VALUE: 29
PIF_VALUE: 26
PIF_VALUE: 31
PIF_VALUE: 20
PIF_VALUE: 15
PIF_VALUE: 30
PIF_VALUE: 14
PIF_VALUE: 30
PIF_VALUE: 1
PIF_VALUE: 31
PIF_VALUE: 19
PIF_VALUE: 14
PIF_VALUE: 14
PIF_VALUE: 26
PIF_VALUE: 28
PIF_VALUE: 13
PIF_VALUE: 20
PIF_VALUE: 30
PIF_VALUE: 28
PIF_VALUE: 14
PIF_VALUE: 24
PIF_VALUE: 27
PIF_VALUE: 26
PIF_VALUE: 20
PIF_VALUE: 23
PIF_VALUE: 9
PIF_VALUE: 23
PIF_VALUE: 22
PIF_VALUE: 29
PIF_VALUE: 21
PIF_VALUE: 30
PIF_VALUE: 14
PIF_VALUE: 31
PIF_VALUE: 6
PIF_VALUE: 4
PIF_VALUE: 26
PIF_VALUE: 19
PIF_VALUE: 30

## 2018-10-29 ASSESSMENT — PAIN SCALES - GENERAL
PAINLEVEL_OUTOF10: 0
PAINLEVEL_OUTOF10: 4
PAINLEVEL_OUTOF10: 0

## 2018-10-29 ASSESSMENT — PAIN - FUNCTIONAL ASSESSMENT: PAIN_FUNCTIONAL_ASSESSMENT: 0-10

## 2018-10-29 NOTE — CARE COORDINATION
Independent  Ability to do shopping:  Independent  Ability to manage finances: Independent  Is patient able to live independently?:  Yes  Hearing and Vision  Care Transitions Interventions         Follow Up  Future Appointments  Date Time Provider Akhil Blackwell   11/7/2018 11:45 AM [facility]   2/4/2019 8:10 AM Nubia Madison MD [facility] [address] [facility]  There are no preventive care reminders to display for this patient.     Maty Henry MA

## 2018-10-29 NOTE — PROGRESS NOTES
CLINICAL PHARMACY NOTE: MEDS TO [address] [place] [address] Drive Select Patient?: Yes  Total # of Prescriptions Filled: 2   The following medications were delivered to the patient:  · ONDANSETRON 4MG  · OXYCODONE 5MG/5ML  Total # of Interventions Completed: 1  Time Spent (min): 60    Additional Documentation:DELIVERED TO ROOM

## 2018-10-30 NOTE — ANESTHESIA POSTPROCEDURE EVALUATION
Department of Anesthesiology  Postprocedure Note    Patient: Marsha Garza  MRN: [id number]  YOB: 1975  Date of evaluation: 10/30/2018  Time:  7:15 AM     Procedure Summary     Date:  10/29/18 Room / Location:  [facility] OR [address] [place] [address] OR    Anesthesia Start:  730 Anesthesia Stop:  4351    Procedure: HERNIA HIATAL LAPAROSCOPIC ROBOTIC WITH NISSEN FUNDOPLICATION (N/A ) Diagnosis:  (HIATAL HERNIA)    Surgeon:  Jefferson Thorne MD Responsible Provider:  Noam Boss MD    Anesthesia Type:  general ASA Status:  3          Anesthesia Type: general    Nneka Phase I: Nneka Score: 10    Nneka Phase II:      Last vitals: Reviewed and per EMR flowsheets.        Anesthesia Post Evaluation   Vital Signs (Current)   Vitals:    10/29/18 1015   BP: 110/81   Pulse: 62   Resp: 18   Temp: 98.1 °F (36.7 °C)   SpO2: 99%     Vital Signs Statistics (for past 48 hrs)     Temp  Av.1 °F (36.7 °C)  Min: 97.4 °F (36.3 °C)   Min taken time: 10/29/18 0754  Max: 98.5 °F (36.9 °C)   Max taken time: 10/29/18 0859  Pulse  Av.8  Min: 58   Min taken time: 10/29/18 1015  Max: 89   Max taken time: 10/29/18 0945  Resp  Av.9  Min: 0   Min taken time: 10/29/18 1485  Max: 23   Max taken time: 10/29/18 1000  BP  Min: 63/49   Min taken time: 10/29/18 1402  Max: 126/74   Max taken time: 10/29/18 0734  SpO2  Av.4 %  Min: 96 %   Min taken time: 10/29/18 0915  Max: 100 %   Max taken time: 10/29/18 0911    BP Readings from Last 3 Encounters:   10/29/18 109/72   10/29/18 110/81   18 112/72       Level of Consciousness:  Awake    Respiratory:  Stable    Airway :   Patent    Oxygen Saturation:  Stable    Cardiovascular:  Stable    Hydration:  Adequate    PONV:  Stable    Post-op Pain:  Adequate analgesia    Post-op Assessment:  No apparent anesthetic complications : subcutaneous emphysema noted: no SOB    Additional Follow-Up / Treatment / Comment:  None

## 2018-11-02 ENCOUNTER — TELEPHONE (OUTPATIENT)
Dept: BARIATRICS/WEIGHT MGMT | Age: 43
End: 2018-11-02

## 2018-11-02 NOTE — TELEPHONE ENCOUNTER
10-29-18 Nissen done  States that she is tolerating liquids and pureed food well.   Would like to advance her diet to [facility]"   She feels that she can tolerate a bit more

## 2018-11-07 ENCOUNTER — OFFICE VISIT (OUTPATIENT)
Dept: BARIATRICS/WEIGHT MGMT | Age: 43
End: 2018-11-07

## 2018-11-07 VITALS
TEMPERATURE: 97.9 F | RESPIRATION RATE: 20 BRPM | BODY MASS INDEX: 20.88 KG/M2 | DIASTOLIC BLOOD PRESSURE: 80 MMHG | HEIGHT: 67 IN | SYSTOLIC BLOOD PRESSURE: 104 MMHG | WEIGHT: 133 LBS | HEART RATE: 77 BPM

## 2018-11-07 DIAGNOSIS — K21.9 HIATAL HERNIA WITH GERD: Primary | ICD-10-CM

## 2018-11-07 DIAGNOSIS — K44.9 HIATAL HERNIA WITH GERD: Primary | ICD-10-CM

## 2018-11-07 PROCEDURE — 99024 POSTOP FOLLOW-UP VISIT: CPT | Performed by: SURGERY

## 2018-11-23 ENCOUNTER — TELEPHONE (OUTPATIENT)
Dept: BARIATRICS/WEIGHT MGMT | Age: 43
End: 2018-11-23

## 2018-11-27 ENCOUNTER — TELEPHONE (OUTPATIENT)
Dept: GASTROENTEROLOGY | Age: 43
End: 2018-11-27

## 2018-11-27 DIAGNOSIS — K21.9 GASTROESOPHAGEAL REFLUX DISEASE, ESOPHAGITIS PRESENCE NOT SPECIFIED: Primary | ICD-10-CM

## 2018-12-06 ENCOUNTER — HOSPITAL ENCOUNTER (OUTPATIENT)
Dept: WOMENS IMAGING | Age: 43
Discharge: HOME | End: 2018-12-08
Payer: COMMERCIAL

## 2018-12-06 DIAGNOSIS — R92.8 ABNORMAL MAMMOGRAM OF BOTH BREASTS: ICD-10-CM

## 2018-12-06 PROCEDURE — G0279 TOMOSYNTHESIS, MAMMO: HCPCS

## 2018-12-19 ENCOUNTER — HOSPITAL ENCOUNTER (OUTPATIENT)
Age: 43
Setting detail: OUTPATIENT SURGERY
Discharge: HOME | End: 2018-12-19
Attending: INTERNAL MEDICINE | Admitting: INTERNAL MEDICINE
Payer: COMMERCIAL

## 2018-12-19 ENCOUNTER — ANESTHESIA EVENT (OUTPATIENT)
Dept: OPERATING ROOM | Age: 43
End: 2018-12-19
Payer: COMMERCIAL

## 2018-12-19 ENCOUNTER — ANESTHESIA (OUTPATIENT)
Dept: OPERATING ROOM | Age: 43
End: 2018-12-19
Payer: COMMERCIAL

## 2018-12-19 VITALS
DIASTOLIC BLOOD PRESSURE: 65 MMHG | SYSTOLIC BLOOD PRESSURE: 93 MMHG | RESPIRATION RATE: 13 BRPM | OXYGEN SATURATION: 97 %

## 2018-12-19 VITALS
TEMPERATURE: 97.3 F | BODY MASS INDEX: 21.19 KG/M2 | WEIGHT: 135 LBS | RESPIRATION RATE: 16 BRPM | DIASTOLIC BLOOD PRESSURE: 80 MMHG | OXYGEN SATURATION: 100 % | HEART RATE: 67 BPM | HEIGHT: 67 IN | SYSTOLIC BLOOD PRESSURE: 107 MMHG

## 2018-12-19 LAB — HCG(URINE) PREGNANCY TEST: NEGATIVE

## 2018-12-19 PROCEDURE — 88305 TISSUE EXAM BY PATHOLOGIST: CPT

## 2018-12-19 PROCEDURE — 6360000002 HC RX W HCPCS: Performed by: NURSE ANESTHETIST, CERTIFIED REGISTERED

## 2018-12-19 PROCEDURE — 2580000003 HC RX 258: Performed by: INTERNAL MEDICINE

## 2018-12-19 PROCEDURE — 84703 CHORIONIC GONADOTROPIN ASSAY: CPT

## 2018-12-19 PROCEDURE — 3609012400 HC EGD TRANSORAL BIOPSY SINGLE/MULTIPLE: Performed by: INTERNAL MEDICINE

## 2018-12-19 PROCEDURE — 3700000001 HC ADD 15 MINUTES (ANESTHESIA): Performed by: INTERNAL MEDICINE

## 2018-12-19 PROCEDURE — 7100000011 HC PHASE II RECOVERY - ADDTL 15 MIN: Performed by: INTERNAL MEDICINE

## 2018-12-19 PROCEDURE — 2709999900 HC NON-CHARGEABLE SUPPLY: Performed by: INTERNAL MEDICINE

## 2018-12-19 PROCEDURE — 2500000003 HC RX 250 WO HCPCS: Performed by: NURSE ANESTHETIST, CERTIFIED REGISTERED

## 2018-12-19 PROCEDURE — 7100000010 HC PHASE II RECOVERY - FIRST 15 MIN: Performed by: INTERNAL MEDICINE

## 2018-12-19 PROCEDURE — 43239 EGD BIOPSY SINGLE/MULTIPLE: CPT | Performed by: INTERNAL MEDICINE

## 2018-12-19 PROCEDURE — 3700000000 HC ANESTHESIA ATTENDED CARE: Performed by: INTERNAL MEDICINE

## 2018-12-19 RX ORDER — PROPOFOL 10 MG/ML
INJECTION, EMULSION INTRAVENOUS PRN
Status: DISCONTINUED | OUTPATIENT
Start: 2018-12-19 | End: 2018-12-19 | Stop reason: SDUPTHER

## 2018-12-19 RX ORDER — LIDOCAINE HYDROCHLORIDE 20 MG/ML
INJECTION, SOLUTION EPIDURAL; INFILTRATION; INTRACAUDAL; PERINEURAL PRN
Status: DISCONTINUED | OUTPATIENT
Start: 2018-12-19 | End: 2018-12-19 | Stop reason: SDUPTHER

## 2018-12-19 RX ORDER — LORATADINE 10 MG/1
10 TABLET ORAL DAILY
COMMUNITY
End: 2019-05-07

## 2018-12-19 RX ORDER — SODIUM CHLORIDE, SODIUM LACTATE, POTASSIUM CHLORIDE, CALCIUM CHLORIDE 600; 310; 30; 20 MG/100ML; MG/100ML; MG/100ML; MG/100ML
INJECTION, SOLUTION INTRAVENOUS CONTINUOUS
Status: DISCONTINUED | OUTPATIENT
Start: 2018-12-19 | End: 2018-12-19 | Stop reason: HOSPADM

## 2018-12-19 RX ORDER — FENTANYL CITRATE 50 UG/ML
INJECTION, SOLUTION INTRAMUSCULAR; INTRAVENOUS PRN
Status: DISCONTINUED | OUTPATIENT
Start: 2018-12-19 | End: 2018-12-19 | Stop reason: SDUPTHER

## 2018-12-19 RX ADMIN — PROPOFOL 20 MG: 10 INJECTION, EMULSION INTRAVENOUS at 09:51

## 2018-12-19 RX ADMIN — PROPOFOL 100 MG: 10 INJECTION, EMULSION INTRAVENOUS at 09:41

## 2018-12-19 RX ADMIN — PROPOFOL 20 MG: 10 INJECTION, EMULSION INTRAVENOUS at 09:45

## 2018-12-19 RX ADMIN — LIDOCAINE HYDROCHLORIDE 3 ML: 20 INJECTION, SOLUTION EPIDURAL; INFILTRATION; INTRACAUDAL at 09:41

## 2018-12-19 RX ADMIN — PROPOFOL 20 MG: 10 INJECTION, EMULSION INTRAVENOUS at 09:43

## 2018-12-19 RX ADMIN — FENTANYL CITRATE 50 MCG: 50 INJECTION INTRAMUSCULAR; INTRAVENOUS at 09:40

## 2018-12-19 RX ADMIN — SODIUM CHLORIDE, POTASSIUM CHLORIDE, SODIUM LACTATE AND CALCIUM CHLORIDE: 600; 310; 30; 20 INJECTION, SOLUTION INTRAVENOUS at 09:07

## 2018-12-19 RX ADMIN — PROPOFOL 20 MG: 10 INJECTION, EMULSION INTRAVENOUS at 09:47

## 2018-12-19 RX ADMIN — PROPOFOL 20 MG: 10 INJECTION, EMULSION INTRAVENOUS at 09:49

## 2018-12-19 ASSESSMENT — PAIN - FUNCTIONAL ASSESSMENT: PAIN_FUNCTIONAL_ASSESSMENT: 0-10

## 2018-12-20 LAB — SURGICAL PATHOLOGY REPORT: NORMAL

## 2019-02-04 PROBLEM — Z00.00 WELLNESS EXAMINATION: Status: ACTIVE | Noted: 2019-02-04

## 2019-03-06 ENCOUNTER — EMPLOYEE WELLNESS (OUTPATIENT)
Dept: OTHER | Age: 44
End: 2019-03-06

## 2019-03-06 PROBLEM — Z00.00 WELLNESS EXAMINATION: Status: RESOLVED | Noted: 2019-02-04 | Resolved: 2019-03-06

## 2019-03-06 LAB
CHOLESTEROL/HDL RATIO: 3.1
CHOLESTEROL: 182 MG/DL
GLUCOSE BLD-MCNC: 91 MG/DL (ref 70–99)
HDLC SERPL-MCNC: 58 MG/DL
LDL CHOLESTEROL: 102 MG/DL (ref 0–130)
PATIENT FASTING?: YES
TRIGL SERPL-MCNC: 110 MG/DL
VLDLC SERPL CALC-MCNC: NORMAL MG/DL (ref 1–30)

## 2019-04-10 ENCOUNTER — TELEPHONE (OUTPATIENT)
Dept: BARIATRICS/WEIGHT MGMT | Age: 44
End: 2019-04-10

## 2019-04-10 NOTE — TELEPHONE ENCOUNTER
Next Visit Date:  Visit date not found    Patient Surgery Date  00/28 Nissen Fundoplication    Type of  Surgery  10/29    Patient calls complaining of  Reflux is improved but has issues with raw vegetables such as cucumbers and broccoli; also meat that has  A lot of gravy or sauce does not go down well; pt concerned if this is normal or is she trying to eat things too soon after her procedure    Onset: 1 month(s) ago  Timing: constant, intermittent  Severity: did not ask    Progression: did not ask    Associated Symptoms: none    Any allergy  To medications     Did not ask    Current  Pharmacy   Did not ask    Patient advised:   If  Symptoms worsen seek treatment at  Emergency Room. You will receive a call back from the office within 24-48 hours.     Is it ok to leave a message if we call back did not ask

## 2019-04-10 NOTE — TELEPHONE ENCOUNTER
All of those things can be problematic. Vegetables showed be steamed.   Some meats may always cause a problem

## 2019-05-06 VITALS — BODY MASS INDEX: 20.52 KG/M2 | WEIGHT: 131 LBS

## 2019-05-07 ENCOUNTER — OFFICE VISIT (OUTPATIENT)
Dept: OBGYN | Age: 44
End: 2019-05-07
Payer: COMMERCIAL

## 2019-05-07 ENCOUNTER — HOSPITAL ENCOUNTER (OUTPATIENT)
Age: 44
Setting detail: SPECIMEN
Discharge: HOME | End: 2019-05-07
Payer: COMMERCIAL

## 2019-05-07 VITALS
WEIGHT: 132.6 LBS | HEIGHT: 67 IN | BODY MASS INDEX: 20.81 KG/M2 | DIASTOLIC BLOOD PRESSURE: 70 MMHG | SYSTOLIC BLOOD PRESSURE: 124 MMHG

## 2019-05-07 DIAGNOSIS — Z01.419 ENCOUNTER FOR ANNUAL ROUTINE GYNECOLOGICAL EXAMINATION: Primary | ICD-10-CM

## 2019-05-07 DIAGNOSIS — Z01.419 ENCOUNTER FOR ANNUAL ROUTINE GYNECOLOGICAL EXAMINATION: ICD-10-CM

## 2019-05-07 PROCEDURE — G0145 SCR C/V CYTO,THINLAYER,RESCR: HCPCS

## 2019-05-07 PROCEDURE — 99396 PREV VISIT EST AGE 40-64: CPT | Performed by: ADVANCED PRACTICE MIDWIFE

## 2019-05-07 ASSESSMENT — ENCOUNTER SYMPTOMS
SORE THROAT: 0
BACK PAIN: 0
SHORTNESS OF BREATH: 0
ABDOMINAL PAIN: 1
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
## 2019-05-13 LAB — CYTOLOGY REPORT: NORMAL

## 2019-10-23 NOTE — ANESTHESIA PRE PROCEDURE
Department of Anesthesiology  Preprocedure Note       Name:  Harvey Holly   Age:  37 y.o.  :  1975                                          MRN:  [id number]         Date:  10/29/2018      Surgeon: Candis Parry):  Ryanne Bianchi MD    Procedure: XI LAPAROSCOPIC ROBOTIC HIATAL HERNIA REPAIR WITH NISSEN FUNDOPLICATION (N/A )  HERNIA HIATAL LAPAROSCOPIC ROBOTIC WITH NISSEN FUNDOPLICATION (N/A )    Medications prior to admission:   Prior to Admission medications    Medication Sig Start Date End Date Taking? Authorizing Provider   oxyCODONE (ROXICODONE) 5 MG/5ML solution Take 5 mLs by mouth every 4 hours as needed for Pain for up to 10 days. . 10/29/18 11/8/18 Yes Diana Mooney, DO   ondansetron (ZOFRAN-ODT) 4 MG disintegrating tablet Take 1 tablet by mouth every 8 hours as needed for Nausea or Vomiting 10/29/18  Yes Diana Mooney, DO   omeprazole (PRILOSEC) 40 MG delayed release capsule Take 1 capsule by mouth 2 times daily 2/3/18  Yes Nayan Dong MD   celecoxib (CELEBREX) 200 MG capsule Take 1 capsule by mouth daily  Patient taking differently: Take 200 mg by mouth daily as needed  18   Nayan Dong MD       Current medications:    Current Facility-Administered Medications   Medication Dose Route Frequency Provider Last Rate Last Dose    lactated ringers infusion   Intravenous Continuous Venecia Salgado  mL/hr at 10/29/18 0655      lidocaine PF 1 % injection 1 mL  1 mL Intradermal Once PRN Venecia Salgado MD        ceFAZolin (ANCEF) 2 g in dextrose 5 % 50 mL IVPB  2 g Intravenous On Call to [address] [place] [address],[address] [facility], MD        scopolamine (TRANSDERM-SCOP) transdermal patch 1 patch  1 patch Transdermal Once Mesha Bustamante MD   1 patch at 10/29/18 0523       Allergies:  No Known Allergies    Problem List:    Patient Active Problem List   Diagnosis Code    Gastroesophageal reflux disease without esophagitis K21.9    Dyspepsia R10.13    Gastroesophageal reflux disease with esophagitis K21.0   
Detail Level: Detailed
Quality 130: Documentation Of Current Medications In The Medical Record: Current Medications Documented
Quality 131: Pain Assessment And Follow-Up: Pain assessment using a standardized tool is documented as negative, no follow-up plan required
Additional Notes: Pain level 0/10

## 2020-01-15 ENCOUNTER — OFFICE VISIT (OUTPATIENT)
Dept: OBGYN | Age: 45
End: 2020-01-15
Payer: COMMERCIAL

## 2020-01-15 VITALS
DIASTOLIC BLOOD PRESSURE: 66 MMHG | WEIGHT: 134 LBS | SYSTOLIC BLOOD PRESSURE: 106 MMHG | BODY MASS INDEX: 21.03 KG/M2 | HEIGHT: 67 IN

## 2020-01-15 PROCEDURE — 99201 PR OFFICE OUTPATIENT NEW 10 MINUTES: CPT | Performed by: OBSTETRICS & GYNECOLOGY

## 2020-01-15 NOTE — PROGRESS NOTES
does have a history of a blood clot in her right calf and so not a candidate for hormonal therapy or Lysteda    No  PT denies fever, chills, nausea and vomiting       Objective: Past test reviewed. Recent normal Pap smear                           Assessment and Plan: We will arrange for an ultrasound exam.  If findings negative consider procedure like NovaSure ablation          Diagnosis Orders   1. Menometrorrhagia               No follow-ups on file. FF: 10 minutes    There are no Patient Instructions on file for this visit. Over 75%of time spent on counseling and care coordination on: see assessment and plan,  She was also counseled on her preventative health maintenance recommendations and follow-up.       Leonel Jones 4:35 PM

## 2020-02-05 ENCOUNTER — OFFICE VISIT (OUTPATIENT)
Dept: OBGYN | Age: 45
End: 2020-02-05
Payer: COMMERCIAL

## 2020-02-05 VITALS
SYSTOLIC BLOOD PRESSURE: 122 MMHG | WEIGHT: 137 LBS | HEIGHT: 67 IN | BODY MASS INDEX: 21.5 KG/M2 | DIASTOLIC BLOOD PRESSURE: 78 MMHG

## 2020-02-05 PROCEDURE — 99213 OFFICE O/P EST LOW 20 MIN: CPT | Performed by: OBSTETRICS & GYNECOLOGY

## 2020-02-05 NOTE — PROGRESS NOTES
ablation. Did review surgical risk of blood loss, infection, small risk of uterine perforation with risk of additional surgery, and failure of the NovaSure. Patient did acknowledge these risk and did sign the surgical consent form.   She will call the office after she is made her final decision regarding this

## 2020-02-24 ENCOUNTER — HOSPITAL ENCOUNTER (OUTPATIENT)
Age: 45
Discharge: HOME | End: 2020-02-24
Payer: COMMERCIAL

## 2020-02-24 LAB
ABO/RH: NORMAL
ABSOLUTE EOS #: 0.07 K/UL (ref 0–0.44)
ABSOLUTE IMMATURE GRANULOCYTE: 0.04 K/UL (ref 0–0.3)
ABSOLUTE LYMPH #: 1.65 K/UL (ref 1.1–3.7)
ABSOLUTE MONO #: 0.55 K/UL (ref 0.1–1.2)
ANTIBODY SCREEN: NEGATIVE
ARM BAND NUMBER: NORMAL
BASOPHILS # BLD: 1 % (ref 0–2)
BASOPHILS ABSOLUTE: 0.04 K/UL (ref 0–0.2)
DIFFERENTIAL TYPE: ABNORMAL
EOSINOPHILS RELATIVE PERCENT: 1 % (ref 1–4)
EXPIRATION DATE: NORMAL
HCG QUALITATIVE: NEGATIVE
HCT VFR BLD CALC: 47.5 % (ref 36.3–47.1)
HEMOGLOBIN: 15.8 G/DL (ref 11.9–15.1)
IMMATURE GRANULOCYTES: 1 %
LYMPHOCYTES # BLD: 26 % (ref 24–43)
MCH RBC QN AUTO: 31.3 PG (ref 25.2–33.5)
MCHC RBC AUTO-ENTMCNC: 33.3 G/DL (ref 28.4–34.8)
MCV RBC AUTO: 94.2 FL (ref 82.6–102.9)
MONOCYTES # BLD: 9 % (ref 3–12)
NRBC AUTOMATED: 0 PER 100 WBC
PDW BLD-RTO: 12.1 % (ref 11.8–14.4)
PLATELET # BLD: 248 K/UL (ref 138–453)
PLATELET ESTIMATE: ABNORMAL
PMV BLD AUTO: 10 FL (ref 8.1–13.5)
RBC # BLD: 5.04 M/UL (ref 3.95–5.11)
RBC # BLD: ABNORMAL 10*6/UL
SEG NEUTROPHILS: 62 % (ref 36–65)
SEGMENTED NEUTROPHILS ABSOLUTE COUNT: 4.11 K/UL (ref 1.5–8.1)
WBC # BLD: 6.5 K/UL (ref 3.5–11.3)
WBC # BLD: ABNORMAL 10*3/UL

## 2020-02-24 PROCEDURE — 85025 COMPLETE CBC W/AUTO DIFF WBC: CPT

## 2020-02-24 PROCEDURE — 86850 RBC ANTIBODY SCREEN: CPT

## 2020-02-24 PROCEDURE — 86901 BLOOD TYPING SEROLOGIC RH(D): CPT

## 2020-02-24 PROCEDURE — 87086 URINE CULTURE/COLONY COUNT: CPT

## 2020-02-24 PROCEDURE — 84703 CHORIONIC GONADOTROPIN ASSAY: CPT

## 2020-02-24 PROCEDURE — 86900 BLOOD TYPING SEROLOGIC ABO: CPT

## 2020-02-24 PROCEDURE — 36415 COLL VENOUS BLD VENIPUNCTURE: CPT

## 2020-02-25 LAB
CULTURE: NO GROWTH
Lab: NORMAL
SPECIMEN DESCRIPTION: NORMAL

## 2020-02-27 ENCOUNTER — ANESTHESIA EVENT (OUTPATIENT)
Dept: OPERATING ROOM | Age: 45
End: 2020-02-27
Payer: COMMERCIAL

## 2020-02-28 ENCOUNTER — HOSPITAL ENCOUNTER (OUTPATIENT)
Age: 45
Setting detail: OUTPATIENT SURGERY
Discharge: HOME | End: 2020-02-28
Attending: OBSTETRICS & GYNECOLOGY | Admitting: OBSTETRICS & GYNECOLOGY
Payer: COMMERCIAL

## 2020-02-28 ENCOUNTER — ANESTHESIA (OUTPATIENT)
Dept: OPERATING ROOM | Age: 45
End: 2020-02-28
Payer: COMMERCIAL

## 2020-02-28 VITALS
TEMPERATURE: 97.3 F | SYSTOLIC BLOOD PRESSURE: 115 MMHG | WEIGHT: 137 LBS | HEIGHT: 67 IN | RESPIRATION RATE: 16 BRPM | OXYGEN SATURATION: 100 % | DIASTOLIC BLOOD PRESSURE: 78 MMHG | HEART RATE: 61 BPM | BODY MASS INDEX: 21.5 KG/M2

## 2020-02-28 VITALS
RESPIRATION RATE: 14 BRPM | SYSTOLIC BLOOD PRESSURE: 112 MMHG | OXYGEN SATURATION: 98 % | TEMPERATURE: 98.6 F | DIASTOLIC BLOOD PRESSURE: 78 MMHG

## 2020-02-28 PROCEDURE — 2500000003 HC RX 250 WO HCPCS: Performed by: NURSE ANESTHETIST, CERTIFIED REGISTERED

## 2020-02-28 PROCEDURE — 7100000011 HC PHASE II RECOVERY - ADDTL 15 MIN: Performed by: OBSTETRICS & GYNECOLOGY

## 2020-02-28 PROCEDURE — 7100000010 HC PHASE II RECOVERY - FIRST 15 MIN: Performed by: OBSTETRICS & GYNECOLOGY

## 2020-02-28 PROCEDURE — 88305 TISSUE EXAM BY PATHOLOGIST: CPT

## 2020-02-28 PROCEDURE — 2709999900 HC NON-CHARGEABLE SUPPLY: Performed by: OBSTETRICS & GYNECOLOGY

## 2020-02-28 PROCEDURE — 2580000003 HC RX 258: Performed by: OBSTETRICS & GYNECOLOGY

## 2020-02-28 PROCEDURE — 6360000002 HC RX W HCPCS: Performed by: OBSTETRICS & GYNECOLOGY

## 2020-02-28 PROCEDURE — 2720000010 HC SURG SUPPLY STERILE: Performed by: OBSTETRICS & GYNECOLOGY

## 2020-02-28 PROCEDURE — 3600000003 HC SURGERY LEVEL 3 BASE: Performed by: OBSTETRICS & GYNECOLOGY

## 2020-02-28 PROCEDURE — 3700000001 HC ADD 15 MINUTES (ANESTHESIA): Performed by: OBSTETRICS & GYNECOLOGY

## 2020-02-28 PROCEDURE — 7100000001 HC PACU RECOVERY - ADDTL 15 MIN: Performed by: OBSTETRICS & GYNECOLOGY

## 2020-02-28 PROCEDURE — 3700000000 HC ANESTHESIA ATTENDED CARE: Performed by: OBSTETRICS & GYNECOLOGY

## 2020-02-28 PROCEDURE — 3600000013 HC SURGERY LEVEL 3 ADDTL 15MIN: Performed by: OBSTETRICS & GYNECOLOGY

## 2020-02-28 PROCEDURE — 7100000000 HC PACU RECOVERY - FIRST 15 MIN: Performed by: OBSTETRICS & GYNECOLOGY

## 2020-02-28 PROCEDURE — 6370000000 HC RX 637 (ALT 250 FOR IP): Performed by: NURSE ANESTHETIST, CERTIFIED REGISTERED

## 2020-02-28 PROCEDURE — 6370000000 HC RX 637 (ALT 250 FOR IP): Performed by: OBSTETRICS & GYNECOLOGY

## 2020-02-28 PROCEDURE — 58563 HYSTEROSCOPY ABLATION: CPT | Performed by: OBSTETRICS & GYNECOLOGY

## 2020-02-28 PROCEDURE — 6360000002 HC RX W HCPCS: Performed by: NURSE ANESTHETIST, CERTIFIED REGISTERED

## 2020-02-28 RX ORDER — HYDROCODONE BITARTRATE AND ACETAMINOPHEN 5; 325 MG/1; MG/1
1 TABLET ORAL EVERY 6 HOURS PRN
Qty: 10 TABLET | Refills: 0 | Status: SHIPPED | OUTPATIENT
Start: 2020-02-28 | End: 2020-04-01 | Stop reason: ALTCHOICE

## 2020-02-28 RX ORDER — SODIUM CHLORIDE 0.9 % (FLUSH) 0.9 %
10 SYRINGE (ML) INJECTION EVERY 12 HOURS SCHEDULED
Status: DISCONTINUED | OUTPATIENT
Start: 2020-02-28 | End: 2020-02-28 | Stop reason: HOSPADM

## 2020-02-28 RX ORDER — HYDROCODONE BITARTRATE AND ACETAMINOPHEN 5; 325 MG/1; MG/1
2 TABLET ORAL PRN
Status: DISCONTINUED | OUTPATIENT
Start: 2020-02-28 | End: 2020-02-28 | Stop reason: HOSPADM

## 2020-02-28 RX ORDER — FENTANYL CITRATE 50 UG/ML
25 INJECTION, SOLUTION INTRAMUSCULAR; INTRAVENOUS EVERY 5 MIN PRN
Status: DISCONTINUED | OUTPATIENT
Start: 2020-02-28 | End: 2020-02-28 | Stop reason: HOSPADM

## 2020-02-28 RX ORDER — PROPOFOL 10 MG/ML
INJECTION, EMULSION INTRAVENOUS CONTINUOUS PRN
Status: DISCONTINUED | OUTPATIENT
Start: 2020-02-28 | End: 2020-02-28 | Stop reason: SDUPTHER

## 2020-02-28 RX ORDER — SODIUM CHLORIDE, SODIUM LACTATE, POTASSIUM CHLORIDE, CALCIUM CHLORIDE 600; 310; 30; 20 MG/100ML; MG/100ML; MG/100ML; MG/100ML
INJECTION, SOLUTION INTRAVENOUS CONTINUOUS
Status: DISCONTINUED | OUTPATIENT
Start: 2020-02-28 | End: 2020-02-28 | Stop reason: SDUPTHER

## 2020-02-28 RX ORDER — HYDROCODONE BITARTRATE AND ACETAMINOPHEN 5; 325 MG/1; MG/1
1 TABLET ORAL EVERY 4 HOURS PRN
Status: DISCONTINUED | OUTPATIENT
Start: 2020-02-28 | End: 2020-02-28 | Stop reason: HOSPADM

## 2020-02-28 RX ORDER — SODIUM CHLORIDE, SODIUM LACTATE, POTASSIUM CHLORIDE, CALCIUM CHLORIDE 600; 310; 30; 20 MG/100ML; MG/100ML; MG/100ML; MG/100ML
INJECTION, SOLUTION INTRAVENOUS CONTINUOUS
Status: DISCONTINUED | OUTPATIENT
Start: 2020-02-28 | End: 2020-02-28 | Stop reason: HOSPADM

## 2020-02-28 RX ORDER — HYDROCODONE BITARTRATE AND ACETAMINOPHEN 5; 325 MG/1; MG/1
2 TABLET ORAL EVERY 4 HOURS PRN
Status: DISCONTINUED | OUTPATIENT
Start: 2020-02-28 | End: 2020-02-28 | Stop reason: HOSPADM

## 2020-02-28 RX ORDER — SODIUM CHLORIDE 0.9 % (FLUSH) 0.9 %
10 SYRINGE (ML) INJECTION PRN
Status: DISCONTINUED | OUTPATIENT
Start: 2020-02-28 | End: 2020-02-28 | Stop reason: HOSPADM

## 2020-02-28 RX ORDER — FENTANYL CITRATE 50 UG/ML
50 INJECTION, SOLUTION INTRAMUSCULAR; INTRAVENOUS EVERY 5 MIN PRN
Status: DISCONTINUED | OUTPATIENT
Start: 2020-02-28 | End: 2020-02-28 | Stop reason: HOSPADM

## 2020-02-28 RX ORDER — SCOLOPAMINE TRANSDERMAL SYSTEM 1 MG/1
PATCH, EXTENDED RELEASE TRANSDERMAL PRN
Status: DISCONTINUED | OUTPATIENT
Start: 2020-02-28 | End: 2020-02-28 | Stop reason: SDUPTHER

## 2020-02-28 RX ORDER — PROPOFOL 10 MG/ML
INJECTION, EMULSION INTRAVENOUS PRN
Status: DISCONTINUED | OUTPATIENT
Start: 2020-02-28 | End: 2020-02-28 | Stop reason: SDUPTHER

## 2020-02-28 RX ORDER — ONDANSETRON 2 MG/ML
INJECTION INTRAMUSCULAR; INTRAVENOUS PRN
Status: DISCONTINUED | OUTPATIENT
Start: 2020-02-28 | End: 2020-02-28

## 2020-02-28 RX ORDER — ONDANSETRON 2 MG/ML
4 INJECTION INTRAMUSCULAR; INTRAVENOUS
Status: DISCONTINUED | OUTPATIENT
Start: 2020-02-28 | End: 2020-02-28 | Stop reason: HOSPADM

## 2020-02-28 RX ORDER — HYDROCODONE BITARTRATE AND ACETAMINOPHEN 5; 325 MG/1; MG/1
1 TABLET ORAL PRN
Status: DISCONTINUED | OUTPATIENT
Start: 2020-02-28 | End: 2020-02-28 | Stop reason: HOSPADM

## 2020-02-28 RX ORDER — DEXAMETHASONE SODIUM PHOSPHATE 4 MG/ML
INJECTION, SOLUTION INTRA-ARTICULAR; INTRALESIONAL; INTRAMUSCULAR; INTRAVENOUS; SOFT TISSUE PRN
Status: DISCONTINUED | OUTPATIENT
Start: 2020-02-28 | End: 2020-02-28 | Stop reason: SDUPTHER

## 2020-02-28 RX ORDER — DIMENHYDRINATE 50 MG/1
50 TABLET ORAL ONCE
Status: COMPLETED | OUTPATIENT
Start: 2020-02-28 | End: 2020-02-28

## 2020-02-28 RX ORDER — ACETAMINOPHEN 325 MG/1
650 TABLET ORAL ONCE
Status: COMPLETED | OUTPATIENT
Start: 2020-02-28 | End: 2020-02-28

## 2020-02-28 RX ORDER — PROMETHAZINE HYDROCHLORIDE 25 MG/ML
6.25 INJECTION, SOLUTION INTRAMUSCULAR; INTRAVENOUS
Status: DISCONTINUED | OUTPATIENT
Start: 2020-02-28 | End: 2020-02-28 | Stop reason: HOSPADM

## 2020-02-28 RX ORDER — KETOROLAC TROMETHAMINE 30 MG/ML
INJECTION, SOLUTION INTRAMUSCULAR; INTRAVENOUS PRN
Status: DISCONTINUED | OUTPATIENT
Start: 2020-02-28 | End: 2020-02-28 | Stop reason: SDUPTHER

## 2020-02-28 RX ORDER — ACETAMINOPHEN 325 MG/1
650 TABLET ORAL EVERY 4 HOURS PRN
Status: DISCONTINUED | OUTPATIENT
Start: 2020-02-28 | End: 2020-02-28 | Stop reason: HOSPADM

## 2020-02-28 RX ORDER — LIDOCAINE HYDROCHLORIDE 20 MG/ML
INJECTION, SOLUTION EPIDURAL; INFILTRATION; INTRACAUDAL; PERINEURAL PRN
Status: DISCONTINUED | OUTPATIENT
Start: 2020-02-28 | End: 2020-02-28 | Stop reason: SDUPTHER

## 2020-02-28 RX ORDER — ONDANSETRON 2 MG/ML
4 INJECTION INTRAMUSCULAR; INTRAVENOUS EVERY 8 HOURS PRN
Status: DISCONTINUED | OUTPATIENT
Start: 2020-02-28 | End: 2020-02-28 | Stop reason: HOSPADM

## 2020-02-28 RX ADMIN — PROPOFOL 150 MG: 10 INJECTION, EMULSION INTRAVENOUS at 12:01

## 2020-02-28 RX ADMIN — PROPOFOL 250 MCG/KG/MIN: 10 INJECTION, EMULSION INTRAVENOUS at 12:01

## 2020-02-28 RX ADMIN — SODIUM CHLORIDE, POTASSIUM CHLORIDE, SODIUM LACTATE AND CALCIUM CHLORIDE: 600; 310; 30; 20 INJECTION, SOLUTION INTRAVENOUS at 10:36

## 2020-02-28 RX ADMIN — LIDOCAINE HYDROCHLORIDE 100 MG: 20 INJECTION, SOLUTION EPIDURAL; INFILTRATION; INTRACAUDAL at 12:01

## 2020-02-28 RX ADMIN — DIMENHYDRINATE 50 MG: 50 TABLET ORAL at 10:37

## 2020-02-28 RX ADMIN — DEXTROSE MONOHYDRATE 2 G: 50 INJECTION, SOLUTION INTRAVENOUS at 11:53

## 2020-02-28 RX ADMIN — ACETAMINOPHEN 650 MG: 325 TABLET, FILM COATED ORAL at 10:37

## 2020-02-28 RX ADMIN — PROPOFOL 50 MG: 10 INJECTION, EMULSION INTRAVENOUS at 12:15

## 2020-02-28 RX ADMIN — DEXAMETHASONE SODIUM PHOSPHATE 8 MG: 4 INJECTION, SOLUTION INTRAMUSCULAR; INTRAVENOUS at 12:08

## 2020-02-28 RX ADMIN — SCOPALAMINE 1 PATCH: 1 PATCH, EXTENDED RELEASE TRANSDERMAL at 11:51

## 2020-02-28 RX ADMIN — KETOROLAC TROMETHAMINE 10 MG: 30 INJECTION, SOLUTION INTRAMUSCULAR; INTRAVENOUS at 12:08

## 2020-02-28 RX ADMIN — ONDANSETRON 4 MG: 2 INJECTION INTRAMUSCULAR; INTRAVENOUS at 12:08

## 2020-02-28 ASSESSMENT — PULMONARY FUNCTION TESTS
PIF_VALUE: 11
PIF_VALUE: 11
PIF_VALUE: 13
PIF_VALUE: 1
PIF_VALUE: 11
PIF_VALUE: 12
PIF_VALUE: 0
PIF_VALUE: 2
PIF_VALUE: 12
PIF_VALUE: 16
PIF_VALUE: 2
PIF_VALUE: 13
PIF_VALUE: 13
PIF_VALUE: 18
PIF_VALUE: 12
PIF_VALUE: 2
PIF_VALUE: 13
PIF_VALUE: 2
PIF_VALUE: 1
PIF_VALUE: 14
PIF_VALUE: 1
PIF_VALUE: 13
PIF_VALUE: 6
PIF_VALUE: 11
PIF_VALUE: 12
PIF_VALUE: 2
PIF_VALUE: 3
PIF_VALUE: 11
PIF_VALUE: 12
PIF_VALUE: 12
PIF_VALUE: 13
PIF_VALUE: 0
PIF_VALUE: 1

## 2020-02-28 ASSESSMENT — PAIN SCALES - GENERAL
PAINLEVEL_OUTOF10: 0
PAINLEVEL_OUTOF10: 2
PAINLEVEL_OUTOF10: 0
PAINLEVEL_OUTOF10: 0

## 2020-02-28 ASSESSMENT — PAIN DESCRIPTION - DESCRIPTORS: DESCRIPTORS: ACHING

## 2020-02-28 ASSESSMENT — PAIN DESCRIPTION - LOCATION: LOCATION: GROIN

## 2020-02-28 NOTE — ANESTHESIA POSTPROCEDURE EVALUATION
Department of Anesthesiology  Postprocedure Note    Patient: Sarah Grant  MRN: [id number]  YOB: 1975  Date of evaluation: 2/28/2020  Time:  3:21 PM     Procedure Summary     Date:  02/28/20 Room / Location:  [address] [place] [address]    Anesthesia Start:  0575 Anesthesia Stop:  4190    Procedure:  DILATATION AND CURETTAGE HYSTEROSCOPY CAUTERY ABLATION (N/A ) Diagnosis:  (HEAVY, IRREGULAR BLEEDING)    Surgeon:  Treasure Cosme MD Responsible Provider:  LOPEZ Harrington CRNA    Anesthesia Type:  general ASA Status:  2          Anesthesia Type: general    Nneka Phase I: Nneka Score: 10    Nneka Phase II: Nneka Score: 10    Last vitals: Reviewed and per EMR flowsheets.        Anesthesia Post Evaluation    Patient location during evaluation: PACU  Patient participation: complete - patient participated  Level of consciousness: awake and alert  Pain score: 0  Airway patency: patent  Nausea & Vomiting: no nausea and no vomiting  Complications: no  Cardiovascular status: blood pressure returned to baseline  Respiratory status: acceptable and room air  Hydration status: stable

## 2020-02-28 NOTE — PROGRESS NOTES
Gina Warren spoke with pt and . Discharge instructions given to pt and . Discharge Criteria    Inpatients must meet Criteria 1 through 7. All other patients are either YES or N/A. If a NO is chosen then Anesthesia or Surgeon must be notified. 1.  Minimum 30 minutes after last dose of sedative medication, minimum 120 minutes after last dose of reversal agent. Yes      2. Systolic BP stable within 20 mmHg for 30 minutes & systolic BP between 90 & 685 or within 10 mmHg of baseline. Yes      3. Pulse between 60 and 100 or within 10 bpm of baseline. Yes      4. Spontaneous respiratory rate >/= 10 per minute. Yes      5. SaO2 >/= 95 or  >/= baseline. Yes      6. Able to cough and swallow or return to baseline function. Yes      7. Alert and oriented or return to baseline mental status. Yes      8. Demonstrates controlled, coordinated movements, ambulates with steady gait, or return to baseline activity function. Yes      9. Minimal or no pain or nausea, or at a level tolerable and acceptable to patient. Yes      10. Takes and retains oral fluids as allowed. Yes      11. Procedural / perioperative site stable. Minimal or no bleeding. Yes          12. If GI endoscopy procedure, minimal or no abdominal distention or passing flatus. N/A      13. Written discharge instructions and emergency telephone number provided. Yes      14. Accompanied by a responsible adult.     Yes
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
## 2020-02-29 NOTE — OP NOTE
[address] [place], [place] [address]                                OPERATIVE REPORT    PATIENT NAME: Buford Apgar                      :        1975  MED REC NO:   [id number]                              ROOM:  ACCOUNT NO:   [de-identified]                           ADMIT DATE: 2020  PROVIDER:     Page Sandoval MD    DATE OF PROCEDURE:  2020    PREOPERATIVE DIAGNOSIS:  Menometrorrhagia. POSTOPERATIVE DIAGNOSIS:  Dysfunctional uterine bleeding. PROCEDURES PERFORMED:  Hysteroscopy, dilatation and curettage, and  Novasure ablation. ANESTHESIA:  General.    ESTIMATED BLOOD LOSS:  Minimal.    COMPLICATIONS OF THE PROCEDURE:  None. FINDINGS:  An anteverted uterus sounding to 9 cm and no adnexal  enlargement. Normal-appearing endometrial cavity with some slight  endometrial thickening at the 6 o'clock area. DESCRIPTION OF PROCEDURE:  The patient was taken to the operating room. General anesthesia was administered, and the patient was placed in the  dorsal lithotomy position where she did undergo perineal prepping,  vaginal prepping, drainage of the bladder, and appropriate draping. With the aid of retractors, the cervix was well visualized, and the  anterior lip was grasped with sharp tooth tenaculum. Taking the slight  anteversion of the uterus into consideration, the uterus sounded to 9  cm. The cervix was then dilated to number 14-[place] with Hanks dilators  in a graduated fashion. This allowed placement of hysteroscope, and the  endometrial cavity was thoroughly viewed. No polyps or fibroids noted. There was some slight endometrial thickening at the 6 o'clock area. At  this point, then the hysteroscope was withdrawn, and sharp curettage was  performed around the endometrial cavity, especially around the 6 o'clock  area, and tissue was sent for pathology. Then the hysteroscopy was  completed.   Cervical

## 2020-03-03 LAB — SURGICAL PATHOLOGY REPORT: NORMAL

## 2020-03-04 ENCOUNTER — EMPLOYEE WELLNESS (OUTPATIENT)
Dept: OTHER | Age: 45
End: 2020-03-04

## 2020-03-06 LAB
3-OH-COTININE: <2 NG/ML
CHOLESTEROL/HDL RATIO: 3.1
CHOLESTEROL: 212 MG/DL
COTININE: <2 NG/ML
GLUCOSE BLD-MCNC: 82 MG/DL (ref 70–99)
HDLC SERPL-MCNC: 68 MG/DL
LDL CHOLESTEROL: 127 MG/DL (ref 0–130)
NICOTINE: <2 NG/ML
PATIENT FASTING?: YES
TRIGL SERPL-MCNC: 85 MG/DL
VLDLC SERPL CALC-MCNC: ABNORMAL MG/DL (ref 1–30)

## 2020-09-15 ENCOUNTER — HOSPITAL ENCOUNTER (OUTPATIENT)
Dept: PHYSICAL THERAPY | Age: 45
Setting detail: THERAPIES SERIES
Discharge: HOME | End: 2020-09-15
Payer: COMMERCIAL

## 2020-09-15 PROCEDURE — 97760 ORTHOTIC MGMT&TRAING 1ST ENC: CPT

## 2020-09-15 PROCEDURE — L3020 FOOT LONGITUD/METATARSAL SUP: HCPCS

## 2020-09-15 NOTE — PLAN OF CARE
Phone: [phone]        Fax: [address] [facility]  Physical Therapy  Orthotic Fitting/Discharge Summary  Date: 9/15/2020    Patient Name: Minerva Seen          : 1975  (16 y.o.)  [facility] #: [id number]    Attending Physician: Talya Duffy. Danielle Simpson MD    Diagnosis: Bilateral bunions, M21.611, M21.612     Reason for Referral:  Pt reports previous orthotics that have helped with pain, but she reports doesn't take pain completely away. She reports her previous orthotics are breaking down and has had more pain recently. Objective Assessment:  Pt has jenna bunions with pes planus. She would benefit from custom orthotics for a first ray cut-out and an active control shell to decrease pain. Treatment:  [x]  Fitting for custom orthotics  [x]  Gait and foot analysis  []   Other:     Instructed Patient:   [x]  Proper fitting and follow up visit. [] Other:        Treatment Goals:  [x]  Met []  Not Met 9/15/2020   [x]  Patient will be fitted for custom orthotics to be issued at a later date. Treatment Plan:   [x]  Assessment for and fitting of custom orthotics. Rehab Potential: []  Poor   []  Fair  [x]  Good   []  Excellent     If there are any questions regarding plan of care, please do not hesitate to contact the center.    Thank you for your referral.      Therapists Signature: Annette Brannon PT, DPT    Date: 9/15/2020        To be completed by the referring physicians   [] Approve the treatment plan as indicated   []  Comments:   Physicians Signature:                        Date: 9/15/2020

## 2020-10-19 VITALS — WEIGHT: 136 LBS | BODY MASS INDEX: 21.3 KG/M2

## 2021-08-20 LAB
CHOLESTEROL/HDL RATIO: 3.3
CHOLESTEROL: 224 MG/DL
GLUCOSE BLD-MCNC: 82 MG/DL (ref 70–99)
HDLC SERPL-MCNC: 67 MG/DL
LDL CHOLESTEROL: 131 MG/DL (ref 0–130)
PATIENT FASTING?: YES
TRIGL SERPL-MCNC: 129 MG/DL
VLDLC SERPL CALC-MCNC: ABNORMAL MG/DL (ref 1–30)

## 2021-08-31 ENCOUNTER — OFFICE VISIT (OUTPATIENT)
Dept: GASTROENTEROLOGY | Age: 46
End: 2021-08-31
Payer: COMMERCIAL

## 2021-08-31 VITALS
DIASTOLIC BLOOD PRESSURE: 79 MMHG | BODY MASS INDEX: 20.56 KG/M2 | WEIGHT: 131 LBS | SYSTOLIC BLOOD PRESSURE: 123 MMHG | HEIGHT: 67 IN

## 2021-08-31 DIAGNOSIS — K21.9 CHRONIC GERD: Primary | Chronic | ICD-10-CM

## 2021-08-31 DIAGNOSIS — T17.308A CHOKING, INITIAL ENCOUNTER: ICD-10-CM

## 2021-08-31 DIAGNOSIS — K58.2 IRRITABLE BOWEL SYNDROME WITH BOTH CONSTIPATION AND DIARRHEA: ICD-10-CM

## 2021-08-31 DIAGNOSIS — Z93.1 S/P NISSEN FUNDOPLICATION (WITH GASTROSTOMY TUBE PLACEMENT) (HCC): ICD-10-CM

## 2021-08-31 PROCEDURE — 99204 OFFICE O/P NEW MOD 45 MIN: CPT | Performed by: INTERNAL MEDICINE

## 2021-08-31 RX ORDER — BISACODYL 5 MG
TABLET, DELAYED RELEASE (ENTERIC COATED) ORAL
Qty: 4 TABLET | Refills: 0 | Status: SHIPPED | OUTPATIENT
Start: 2021-08-31 | End: 2022-01-25 | Stop reason: ALTCHOICE

## 2021-08-31 RX ORDER — POLYETHYLENE GLYCOL 3350 17 G/17G
POWDER, FOR SOLUTION ORAL
Qty: 238 G | Refills: 0 | Status: SHIPPED | OUTPATIENT
Start: 2021-08-31 | End: 2022-04-12

## 2021-08-31 ASSESSMENT — ENCOUNTER SYMPTOMS
COUGH: 0
ABDOMINAL PAIN: 1
CONSTIPATION: 0
SHORTNESS OF BREATH: 0
CHOKING: 1
SORE THROAT: 1
ANAL BLEEDING: 0
DIARRHEA: 0
BLOOD IN STOOL: 0
TROUBLE SWALLOWING: 1
ABDOMINAL DISTENTION: 1
VOICE CHANGE: 1
VOMITING: 1
RECTAL PAIN: 0
NAUSEA: 0

## 2021-08-31 NOTE — PROGRESS NOTES
history remarkable for , referred for evaluation of   Chief Complaint   Patient presents with    New Patient     Patient referred for chronic GERD and choking.  Gastroesophageal Reflux     Patient notes hx of nissen and egd's with Dr. Ranjeet Conner, hx of 2cm of errosion. Patient notes taking 40mg protonix daily with additional tums for her heartburn. Notes it radiates up to her upper chest    Dysphagia     Notes hx of choking. Notes hx of recently choking 3 days in a row. Notes mostly choking on food. She notes staying away from porkchops, steaks, certain bread. Notes sometimes issue with sticky rice.  Abdominal Pain     Notes it might be stressed induced. .    Past Medical,Family, and Social History reviewed and does contribute to the patient presenting condition. Patient's PMH/PSH,SH,PSYCH Hx, MEDs, ALLERGIES, and ROS were all reviewed and updated in the appropriate sections.     PAST MEDICAL HISTORY:  Past Medical History:   Diagnosis Date    Acute superficial venous thrombosis of lower extremity     POSTPARTUM    Arthritis     GERD (gastroesophageal reflux disease)     Goiter     Postoperative nausea     Reflux esophagitis        Past Surgical History:   Procedure Laterality Date    DILATION AND CURETTAGE OF UTERUS      ablation    DILATION AND CURETTAGE OF UTERUS N/A 2/28/2020    DILATATION AND CURETTAGE HYSTEROSCOPY CAUTERY ABLATION performed by Jose Ortega MD at [facility]    Right 5th finger - tendon    HERNIA REPAIR  2010    HIATAL HERNIA REPAIR  10/29/2018    LAPAROSCOPIC ROBOTIC HIATAL HERNIA REPAIR WITH NISSEN FUNDOPLICATION    [place] LAP, REPAIR PARAESOPHAGEAL HERNIA, INCL FUNDOPLASTY W/ MESH N/A 10/29/2018    HERNIA HIATAL LAPAROSCOPIC ROBOTIC WITH NISSEN FUNDOPLICATION performed by Guadalupe Foley MD at [address] [facility] 2/14/2018    EGD BIOPSY performed by Michelle Hendricks Substance and Sexual Activity    Alcohol use: Yes     Comment: social    Drug use: No    Sexual activity: Yes     Partners: Male     Birth control/protection: Condom   Other Topics Concern    Not on file   Social History Narrative    Not on file     Social Determinants of Health     Financial Resource Strain: Low Risk     Difficulty of Paying Living Expenses: Not hard at all   Food Insecurity: No Food Insecurity    Worried About Running Out of Food in the Last Year: Never true    Aileen of Food in the Last Year: Never true   Transportation Needs: No Transportation Needs    Lack of Transportation (Medical): No    Lack of Transportation (Non-Medical): No   Physical Activity:     Days of Exercise per Week:     Minutes of Exercise per Session:    Stress:     Feeling of Stress :    Social Connections:     Frequency of Communication with Friends and Family:     Frequency of Social Gatherings with Friends and Family:     Attends Sikhism Services:     Active Member of Clubs or Organizations:     Attends Club or Organization Meetings:     Marital Status:    Intimate Partner Violence:     Fear of Current or Ex-Partner:     Emotionally Abused:     Physically Abused:     Sexually Abused:          REVIEW OF SYSTEMS:         Review of Systems   Constitutional: Negative for appetite change, fatigue and unexpected weight change. HENT: Positive for sore throat, trouble swallowing and voice change. Negative for dental problem. Eyes: Negative for visual disturbance. Respiratory: Positive for choking. Negative for cough and shortness of breath. Cardiovascular: Positive for chest pain. Negative for leg swelling. Gastrointestinal: Positive for abdominal distention, abdominal pain and vomiting. Negative for anal bleeding, blood in stool, constipation, diarrhea, nausea and rectal pain. Musculoskeletal: Positive for arthralgias and joint swelling. Negative for myalgias. Neurological: Negative. Psychiatric/Behavioral: Positive for sleep disturbance. The patient is not nervous/anxious. PHYSICAL EXAMINATION: Vital signs reviewed per the nursing documentation. /79   Ht 5' 7\" (1.702 m)   Wt 131 lb (59.4 kg)   BMI 20.52 kg/m²   Body mass index is 20.52 kg/m². Physical Exam  Nursing note reviewed. Constitutional:       Appearance: She is well-developed. Comments: Anxious    HENT:      Head: Normocephalic and atraumatic. Eyes:      Conjunctiva/sclera: Conjunctivae normal.      Pupils: Pupils are equal, round, and reactive to light. Cardiovascular:      Heart sounds: Normal heart sounds. Pulmonary:      Effort: Pulmonary effort is normal.      Breath sounds: Normal breath sounds. Abdominal:      General: Bowel sounds are normal.      Palpations: Abdomen is soft. Comments: NON TENDER, NON DISTENTED  LIVER SPLEEN AND HERNIAS ARE NOT  PALPABLE  BOWEL SOUNDS ARE POSITIVE      Musculoskeletal:         General: Normal range of motion. Cervical back: Normal range of motion and neck supple. Skin:     General: Skin is warm. Neurological:      Mental Status: She is alert and oriented to person, place, and time. Psychiatric:         Behavior: Behavior normal.           LABORATORY DATA: Reviewed  Lab Results   Component Value Date    WBC 6.5 02/24/2020    HGB 15.8 (H) 02/24/2020    HCT 47.5 (H) 02/24/2020    MCV 94.2 02/24/2020     02/24/2020    CREATININE 0.96 10/29/2018    INR 1.0 10/29/2018         Lab Results   Component Value Date    RBC 5.04 02/24/2020    HGB 15.8 (H) 02/24/2020    MCV 94.2 02/24/2020    MCH 31.3 02/24/2020    MCHC 33.3 02/24/2020    RDW 12.1 02/24/2020    MPV 10.0 02/24/2020    BASOPCT 1 02/24/2020    LYMPHSABS 1.65 02/24/2020    MONOSABS 0.55 02/24/2020    NEUTROABS 4.11 02/24/2020    EOSABS 0.07 02/24/2020    BASOSABS 0.04 02/24/2020         DIAGNOSTIC TESTING:     No results found. Assessment  1. Chronic GERD    2.  Choking, initial encounter    3. S/P Nissen fundoplication (with gastrostomy tube placement) ([address]    4. Irritable bowel syndrome with both constipation and diarrhea        Plan    Plan EGD and Colonoscopy     The Endoscopic procedure was explained to the patient in detail  The prep and NPO were explained  All the Risks, Benefits, and Alternatives were explained  Risk of Bleeding, Perforation and Cardio Respiratory risks were explained  her questions were answered  The procedure has been scheduled with the  in the office  Patient was asked to give us a call for any questions  The patient has verbalized understanding and agreement to this plan. Pt was asked to chew food well  Take time in eating  Sit up or prop up when eating  Don't talk when eating  Walk after finish eating  Use liquids with meals if has issues  The patient has verbalized understanding and agreemenet to this. Pt seems to have signs and symptoms consistent with GERD, acid indigestion and heartburns. She was discussed  in detail about some possible life style and dietary modifications. She was stressed about the maintenance  of appropriate weight and effect of obesity contributing to reflux symptoms. Routine exercise was streesed. Avoidance of Caffeine, nicotine and chocolate were explained. Pt was asked to avoid spices grease and fried food. Advices were also given about avoidance of any kind of fast foods, soda pops and high energy drinks. Pt was advised to place two small block under the head end of the bed which may help with night time reflux. Was advised not to eat any thin at least 2-3 hrs before going to bed and walk especially after dinner    Pt has verbalized understanding and agreement to this plan.     The patient was instructed to start taking some OTC Probiotics products   These are available over the counter at the Pharmacy stores and Grocery stores  He was explained about the beneficial effects they have in the GI track  They will

## 2021-09-01 ENCOUNTER — TELEPHONE (OUTPATIENT)
Dept: GASTROENTEROLOGY | Age: 46
End: 2021-09-01

## 2021-09-02 NOTE — TELEPHONE ENCOUNTER
Called and spoke to pt. Pt wanted to r/s to 10/14. Pt notified only time available is 1145am and pt declined. Pt stating she would keep her current scheduled date/time.

## 2021-10-11 ENCOUNTER — HOSPITAL ENCOUNTER (OUTPATIENT)
Dept: LAB | Age: 46
Setting detail: SPECIMEN
Discharge: HOME | End: 2021-10-11
Payer: COMMERCIAL

## 2021-10-11 DIAGNOSIS — Z01.818 PREOP TESTING: Primary | ICD-10-CM

## 2021-10-11 DIAGNOSIS — Z01.818 PREOP TESTING: ICD-10-CM

## 2021-10-11 PROCEDURE — U0003 INFECTIOUS AGENT DETECTION BY NUCLEIC ACID (DNA OR RNA); SEVERE ACUTE RESPIRATORY SYNDROME CORONAVIRUS 2 (SARS-COV-2) (CORONAVIRUS DISEASE [COVID-19]), AMPLIFIED PROBE TECHNIQUE, MAKING USE OF HIGH THROUGHPUT TECHNOLOGIES AS DESCRIBED BY CMS-2020-01-R: HCPCS

## 2021-10-11 PROCEDURE — U0005 INFEC AGEN DETEC AMPLI PROBE: HCPCS

## 2021-10-11 PROCEDURE — C9803 HOPD COVID-19 SPEC COLLECT: HCPCS

## 2021-10-11 NOTE — TELEPHONE ENCOUNTER
Pt called office with bowel prep and CLD questions. Writer answered all questions and reviewed CLD over the phone. Pt had no further questions.

## 2021-10-12 ENCOUNTER — ANESTHESIA EVENT (OUTPATIENT)
Dept: OPERATING ROOM | Age: 46
End: 2021-10-12
Payer: COMMERCIAL

## 2021-10-12 LAB
SARS-COV-2: NORMAL
SARS-COV-2: NOT DETECTED
SOURCE: NORMAL

## 2021-10-13 ENCOUNTER — ANESTHESIA (OUTPATIENT)
Dept: OPERATING ROOM | Age: 46
End: 2021-10-13
Payer: COMMERCIAL

## 2021-10-13 ENCOUNTER — HOSPITAL ENCOUNTER (OUTPATIENT)
Age: 46
Setting detail: OUTPATIENT SURGERY
Discharge: HOME | End: 2021-10-13
Attending: INTERNAL MEDICINE | Admitting: INTERNAL MEDICINE
Payer: COMMERCIAL

## 2021-10-13 VITALS — OXYGEN SATURATION: 100 % | DIASTOLIC BLOOD PRESSURE: 81 MMHG | SYSTOLIC BLOOD PRESSURE: 120 MMHG

## 2021-10-13 VITALS
HEART RATE: 68 BPM | TEMPERATURE: 98.1 F | SYSTOLIC BLOOD PRESSURE: 136 MMHG | WEIGHT: 135 LBS | OXYGEN SATURATION: 99 % | HEIGHT: 67 IN | RESPIRATION RATE: 21 BRPM | DIASTOLIC BLOOD PRESSURE: 78 MMHG | BODY MASS INDEX: 21.19 KG/M2

## 2021-10-13 LAB — HCG, PREGNANCY URINE (POC): NEGATIVE

## 2021-10-13 PROCEDURE — 7100000010 HC PHASE II RECOVERY - FIRST 15 MIN: Performed by: INTERNAL MEDICINE

## 2021-10-13 PROCEDURE — 2709999900 HC NON-CHARGEABLE SUPPLY: Performed by: INTERNAL MEDICINE

## 2021-10-13 PROCEDURE — 43239 EGD BIOPSY SINGLE/MULTIPLE: CPT | Performed by: INTERNAL MEDICINE

## 2021-10-13 PROCEDURE — 88305 TISSUE EXAM BY PATHOLOGIST: CPT

## 2021-10-13 PROCEDURE — 7100000011 HC PHASE II RECOVERY - ADDTL 15 MIN: Performed by: INTERNAL MEDICINE

## 2021-10-13 PROCEDURE — 2500000003 HC RX 250 WO HCPCS: Performed by: NURSE ANESTHETIST, CERTIFIED REGISTERED

## 2021-10-13 PROCEDURE — 6370000000 HC RX 637 (ALT 250 FOR IP)

## 2021-10-13 PROCEDURE — 81025 URINE PREGNANCY TEST: CPT

## 2021-10-13 PROCEDURE — 6360000002 HC RX W HCPCS

## 2021-10-13 PROCEDURE — 3609010400 HC COLONOSCOPY POLYPECTOMY HOT BIOPSY: Performed by: INTERNAL MEDICINE

## 2021-10-13 PROCEDURE — 6360000002 HC RX W HCPCS: Performed by: NURSE ANESTHETIST, CERTIFIED REGISTERED

## 2021-10-13 PROCEDURE — 3609012400 HC EGD TRANSORAL BIOPSY SINGLE/MULTIPLE: Performed by: INTERNAL MEDICINE

## 2021-10-13 PROCEDURE — 3700000000 HC ANESTHESIA ATTENDED CARE: Performed by: INTERNAL MEDICINE

## 2021-10-13 PROCEDURE — 45385 COLONOSCOPY W/LESION REMOVAL: CPT | Performed by: INTERNAL MEDICINE

## 2021-10-13 PROCEDURE — 2580000003 HC RX 258: Performed by: ANESTHESIOLOGY

## 2021-10-13 PROCEDURE — 3700000001 HC ADD 15 MINUTES (ANESTHESIA): Performed by: INTERNAL MEDICINE

## 2021-10-13 RX ORDER — SODIUM CHLORIDE 9 MG/ML
25 INJECTION, SOLUTION INTRAVENOUS PRN
Status: DISCONTINUED | OUTPATIENT
Start: 2021-10-13 | End: 2021-10-13 | Stop reason: HOSPADM

## 2021-10-13 RX ORDER — PROPOFOL 10 MG/ML
INJECTION, EMULSION INTRAVENOUS PRN
Status: DISCONTINUED | OUTPATIENT
Start: 2021-10-13 | End: 2021-10-13 | Stop reason: SDUPTHER

## 2021-10-13 RX ORDER — LIDOCAINE HYDROCHLORIDE 20 MG/ML
INJECTION, SOLUTION EPIDURAL; INFILTRATION; INTRACAUDAL; PERINEURAL PRN
Status: DISCONTINUED | OUTPATIENT
Start: 2021-10-13 | End: 2021-10-13 | Stop reason: SDUPTHER

## 2021-10-13 RX ORDER — SIMETHICONE 80 MG
160 TABLET,CHEWABLE ORAL ONCE
Status: COMPLETED | OUTPATIENT
Start: 2021-10-13 | End: 2021-10-13

## 2021-10-13 RX ORDER — LIDOCAINE HYDROCHLORIDE 10 MG/ML
1 INJECTION, SOLUTION EPIDURAL; INFILTRATION; INTRACAUDAL; PERINEURAL
Status: DISCONTINUED | OUTPATIENT
Start: 2021-10-14 | End: 2021-10-13 | Stop reason: HOSPADM

## 2021-10-13 RX ORDER — ONDANSETRON 2 MG/ML
4 INJECTION INTRAMUSCULAR; INTRAVENOUS ONCE
Status: DISCONTINUED | OUTPATIENT
Start: 2021-10-13 | End: 2021-10-13 | Stop reason: HOSPADM

## 2021-10-13 RX ORDER — SODIUM CHLORIDE 0.9 % (FLUSH) 0.9 %
10 SYRINGE (ML) INJECTION EVERY 12 HOURS SCHEDULED
Status: DISCONTINUED | OUTPATIENT
Start: 2021-10-13 | End: 2021-10-13 | Stop reason: HOSPADM

## 2021-10-13 RX ORDER — SODIUM CHLORIDE 9 MG/ML
INJECTION, SOLUTION INTRAVENOUS CONTINUOUS
Status: DISCONTINUED | OUTPATIENT
Start: 2021-10-14 | End: 2021-10-13 | Stop reason: HOSPADM

## 2021-10-13 RX ORDER — NICOTINE 14MG/24HR
250 PATCH, TRANSDERMAL 24 HOURS TRANSDERMAL
COMMUNITY
End: 2022-02-16

## 2021-10-13 RX ORDER — SODIUM CHLORIDE 0.9 % (FLUSH) 0.9 %
10 SYRINGE (ML) INJECTION PRN
Status: DISCONTINUED | OUTPATIENT
Start: 2021-10-13 | End: 2021-10-13 | Stop reason: HOSPADM

## 2021-10-13 RX ORDER — ONDANSETRON 2 MG/ML
INJECTION INTRAMUSCULAR; INTRAVENOUS
Status: COMPLETED
Start: 2021-10-13 | End: 2021-10-13

## 2021-10-13 RX ORDER — SODIUM CHLORIDE, SODIUM LACTATE, POTASSIUM CHLORIDE, CALCIUM CHLORIDE 600; 310; 30; 20 MG/100ML; MG/100ML; MG/100ML; MG/100ML
INJECTION, SOLUTION INTRAVENOUS CONTINUOUS
Status: DISCONTINUED | OUTPATIENT
Start: 2021-10-14 | End: 2021-10-13 | Stop reason: HOSPADM

## 2021-10-13 RX ADMIN — SODIUM CHLORIDE, POTASSIUM CHLORIDE, SODIUM LACTATE AND CALCIUM CHLORIDE: 600; 310; 30; 20 INJECTION, SOLUTION INTRAVENOUS at 08:35

## 2021-10-13 RX ADMIN — PROPOFOL 30 MG: 10 INJECTION, EMULSION INTRAVENOUS at 09:42

## 2021-10-13 RX ADMIN — PROPOFOL 30 MG: 10 INJECTION, EMULSION INTRAVENOUS at 09:56

## 2021-10-13 RX ADMIN — PROPOFOL 50 MG: 10 INJECTION, EMULSION INTRAVENOUS at 09:34

## 2021-10-13 RX ADMIN — PROPOFOL 30 MG: 10 INJECTION, EMULSION INTRAVENOUS at 10:00

## 2021-10-13 RX ADMIN — PROPOFOL 30 MG: 10 INJECTION, EMULSION INTRAVENOUS at 09:49

## 2021-10-13 RX ADMIN — PROPOFOL 30 MG: 10 INJECTION, EMULSION INTRAVENOUS at 09:46

## 2021-10-13 RX ADMIN — PROPOFOL 30 MG: 10 INJECTION, EMULSION INTRAVENOUS at 09:52

## 2021-10-13 RX ADMIN — ONDANSETRON 4 MG: 2 INJECTION INTRAMUSCULAR; INTRAVENOUS at 10:41

## 2021-10-13 RX ADMIN — LIDOCAINE HYDROCHLORIDE 100 MG: 20 INJECTION, SOLUTION EPIDURAL; INFILTRATION; INTRACAUDAL; PERINEURAL at 09:31

## 2021-10-13 RX ADMIN — SIMETHICONE 160 MG: 80 TABLET, CHEWABLE ORAL at 11:39

## 2021-10-13 RX ADMIN — PROPOFOL 50 MG: 10 INJECTION, EMULSION INTRAVENOUS at 09:31

## 2021-10-13 RX ADMIN — PROPOFOL 30 MG: 10 INJECTION, EMULSION INTRAVENOUS at 09:38

## 2021-10-13 RX ADMIN — PROPOFOL 30 MG: 10 INJECTION, EMULSION INTRAVENOUS at 10:05

## 2021-10-13 RX ADMIN — PROPOFOL 50 MG: 10 INJECTION, EMULSION INTRAVENOUS at 09:32

## 2021-10-13 ASSESSMENT — PAIN SCALES - GENERAL
PAINLEVEL_OUTOF10: 0

## 2021-10-13 ASSESSMENT — PULMONARY FUNCTION TESTS
PIF_VALUE: 1
PIF_VALUE: 3
PIF_VALUE: 1
PIF_VALUE: 3
PIF_VALUE: 1

## 2021-10-13 ASSESSMENT — PAIN - FUNCTIONAL ASSESSMENT: PAIN_FUNCTIONAL_ASSESSMENT: 0-10

## 2021-10-13 NOTE — OP NOTE
PROCEDURE NOTE    DATE OF PROCEDURE: 10/13/2021    SURGEON: Dhaval Doherty MD    ASSISTANT: None    PREOPERATIVE DIAGNOSIS: SCREENING  ABD PAINS    POSTOPERATIVE DIAGNOSIS: as described below    OPERATION: Total colonoscopy HOT SNARE POLYPECTOMY    ANESTHESIA: MAC PER ANESTHESIA     ESTIMATED BLOOD LOSS: less than 50     COMPLICATIONS: None. SPECIMENS:  Was Obtained:     HISTORY: The patient is a 39y.o. year old female with history of above preop diagnosis. I recommended colonoscopy with possible biopsy or polypectomy and I explained the risk, benefits, expected outcome, and alternatives to the procedure. Risks included but are not limited to bleeding, infection, respiratory distress, hypotension, and perforation of the colon and possibility of missing a lesion. The patient understands and is in agreement. PROCEDURE: The patient was given IV conscious sedation. The patient's SPO2 remained above 90% throughout the procedure. The colonoscope was inserted per rectum and advanced under direct vision to the cecum with difficulty. The prep was fair TO GOOD    VERY REDUNDANT AND FLOPPY COLON   PRESSURES WERE APPLIED TO REACH THE CECUM  AGGRESSIVE FLUSHING DONE THE BEST OF THE ABILITY      Findings:  Terminal ileum: not examined    Cecum/Ascending colon: abnormal: AT HEPATIC FLEX A VERY FLAT ONE CM POLYP OVER A FOLD WAS REMOVED WITH SNARE (HARD) CAUTERY     Transverse colon: normal    Descending/Sigmoid colon: normal    Rectum/Anus: examined in normal and retroflexed positions and was abnormal: MOD INT HEMORRHODIS    Withdrawal Time was (minutes): 30    The colon was decompressed and the scope was removed. The patient tolerated the procedure well. Recommendations/Plan:   1. Lifestyle and dietary modifications as discussed  2. F/U Biopsies  3. F/U In Office in 3-4 weeks  4. Discussed with the family  5. Colonoscopy Recall :2 year WITH BETTER PREP  6.  POST SEDATION PATIENT WAS STABLE WITH STABLE VITAL SIGNS AND OXYGEN SATURATIONS AND WAS DISCHARGED HOME WITH RIDE IN A STABLE CONDITION.     Electronically signed by Josue Robertson MD  on 10/13/2021 at 10:10 AM

## 2021-10-13 NOTE — ANESTHESIA POSTPROCEDURE EVALUATION
Department of Anesthesiology  Postprocedure Note    Patient: Fabiola Fernandes  MRN: [id number]  YOB: 1975  Date of evaluation: 10/13/2021  Time:  11:08 AM     Procedure Summary     Date: 10/13/21 Room / Location: [place] [address] / [facility] [place] - INPATIENT    Anesthesia Start: 0930 Anesthesia Stop: 7352    Procedures:       EGD BIOPSY (N/A )      COLONOSCOPY POLYPECTOMY HOT BIOPSY (N/A ) Diagnosis: (DX SCREENING, GERD)    Surgeons: Josue Robertson MD Responsible Provider: Liana Be DO    Anesthesia Type: MAC ASA Status: 3          Anesthesia Type: MAC    Nneka Phase I: Nneka Score: 10    Nneka Phase II: Nneka Score: 9    Last vitals: Reviewed and per EMR flowsheets.        Anesthesia Post Evaluation    Patient location during evaluation: PACU  Patient participation: complete - patient participated  Level of consciousness: awake and alert  Airway patency: patent  Nausea & Vomiting: no nausea and no vomiting  Complications: no  Cardiovascular status: hemodynamically stable  Respiratory status: acceptable  Hydration status: stable

## 2021-10-13 NOTE — OP NOTE
PROCEDURE NOTE    DATE OF PROCEDURE: 10/13/2021     SURGEON: Michael Silva MD    ASSISTANT: None    PREOPERATIVE DIAGNOSIS: GERD  HX OF  NISSEN  DYSPHAGIA    POSTOPERATIVE DIAGNOSIS: As described below    OPERATION: Upper GI endoscopy with Biopsy    ANESTHESIA: MAC PER ANESTHESIA     ESTIMATED BLOOD LOSS: Less than 50 ml    COMPLICATIONS: None. SPECIMENS:  Was Obtained:     HISTORY: The patient is a 39y.o. year old female with history of above preop diagnosis. I recommended esophagogastroduodenoscopy with possible biopsy and I explained the risk, benefits, expected outcome, and alternatives to the procedure. Risks included but are not limited to bleeding, infection, respiratory distress, hypotension, and perforation of the esophagus, stomach, or duodenum. Patient understands and is in agreement. PROCEDURE: The patient was given IV conscious sedation. The patient's SPO2 remained above 90% throughout the procedure. The gastroscope was inserted orally and advanced under direct vision through the esophagus, through the stomach, through the pylorus, and into the descending duodenum. Findings:    Retropharyngeal area was grossly normal appearing    Esophagus: abnormal: TERTIARY CONTRACTIONS  MILD IRREGULAR SCJ BIOPSIES AND PICTURES WERE TAKEN  NO LUMINAL STRICTURES OR LESIONS WERE SEEN      Stomach:    Fundus: abnormal: EVIDENCE OF NISSEN WRAP  A SMALL POLYP WAS NOTED AND WAS BIOPSIED IN THE FUNDIC AREA CLOSE TO THE WRAP    Body: normal    Antrum: normal    Duodenum:     Descending: normal    Bulb: normal    The scope was removed and the patient tolerated the procedure well. Recommendations/Plan:   1. F/U Biopsies  2. F/U In Office in 3-4 weeks  3. Discussed with the family  4.  Post sedation patient was stable with stable vital signs and stable O2 saturations    Electronically signed by Michael Silva MD  on 10/13/2021 at 9:39 AM

## 2021-10-13 NOTE — ANESTHESIA PRE PROCEDURE
Department of Anesthesiology  Preprocedure Note       Name:  Fabiola Fernandes   Age:  39 y.o.  :  1975                                          MRN:  [id number]         Date:  10/13/2021      Surgeon: Katherine Garcia):  Josue Robertson MD    Procedure: Procedure(s):  EGD ESOPHAGOGASTRODUODENOSCOPY  COLORECTAL CANCER SCREENING, NOT HIGH RISK    Medications prior to admission:   Prior to Admission medications    Medication Sig Start Date End Date Taking? Authorizing Provider   Calcium-Magnesium-Zinc 500-250-12.5 MG TABS Take 1 mg by mouth daily   Yes Historical Provider, MD   Saccharomyces boulardii (PROBIOTIC) 250 MG CAPS Take 250 mg by mouth   Yes Historical Provider, MD   pantoprazole (PROTONIX) 40 MG tablet Take 1 tablet by mouth daily 21  Yes Guadalupe Snowden MD   polyethylene glycol [facility]) 17 GM/SCOOP powder Follow instructions provided to you from physician's office. 21   Josue Robertson MD   bisacodyl (BISACODYL) 5 MG EC tablet Follow instructions provided given by the physician's office.  21   Josue Robertson MD   valACYclovir (VALTREX) 500 MG tablet Take 4 tablets by mouth 2 times daily For one day 21   Guadalupe Snowden MD   celecoxib (CELEBREX) 200 MG capsule Take 1 capsule by mouth 2 times daily as needed for Pain 20   Guadalupe Snowden MD       Current medications:    Current Facility-Administered Medications   Medication Dose Route Frequency Provider Last Rate Last Admin    [START ON 10/14/2021] 0.9 % sodium chloride infusion   IntraVENous Continuous Josue Geronimo DO        [START ON 10/14/2021] lactated ringers infusion   IntraVENous Continuous Liana Be  mL/hr at 10/13/21 0835 New Bag at 10/13/21 0835    sodium chloride flush 0.9 % injection 10 mL  10 mL IntraVENous 2 times per day Liana Be DO        sodium chloride flush 0.9 % injection 10 mL  10 mL IntraVENous PRN Liana Be DO        0.9 % sodium chloride infusion  25 mL IntraVENous PRN Hershal Holding, DO        [START ON 10/14/2021] lidocaine PF 1 % injection 1 mL  1 mL IntraDERmal Once PRN Hershal Holding, DO           Allergies:  No Known Allergies    Problem List:    Patient Active Problem List   Diagnosis Code    Chronic GERD K21.9    Choking T17.308A    S/P Nissen fundoplication (with gastrostomy tube placement) ([facility] [address] Z93.1    Irritable bowel syndrome with both constipation and diarrhea K58.2       Past Medical History:        Diagnosis Date    Acute superficial venous thrombosis of lower extremity     POSTPARTUM    Arthritis     GERD (gastroesophageal reflux disease)     Goiter     Hx of blood clots     Superficial Right Leg    Postoperative nausea     Reflux esophagitis     Sleep apnea        Past Surgical History:        Procedure Laterality Date    DILATION AND CURETTAGE OF UTERUS      ablation    DILATION AND CURETTAGE OF UTERUS N/A 2/28/2020    DILATATION AND CURETTAGE HYSTEROSCOPY CAUTERY ABLATION performed by Quentin Lane MD at [address] [place] [address]    Right 5th finger - tendon    HERNIA REPAIR  2010    HIATAL HERNIA REPAIR  10/29/2018    LAPAROSCOPIC ROBOTIC HIATAL HERNIA REPAIR WITH NISSEN FUNDOPLICATION    OR LAP, REPAIR PARAESOPHAGEAL HERNIA, INCL FUNDOPLASTY W/ MESH N/A 10/29/2018    HERNIA HIATAL LAPAROSCOPIC ROBOTIC WITH NISSEN FUNDOPLICATION performed by Leann Stapleton MD at [facility] 2/14/2018    EGD BIOPSY performed by Tre Bermudez MD at [address] [place] [address] (grade 2 reflux esophagitis)    UPPER GASTROINTESTINAL ENDOSCOPY  4/19/2018    ESOPHAGEAL CAPSULE ENDOSCOPY performed by Beti Hampton MD at [address] [place] [address] N/A 4/19/2018    EGD BIOPSY performed by Beti Hampton MD at [address] [place] [address]  12/19/2018    -bx's(duodenal-normal,antral-mild chronic inactive gastritis,esoph-mild active inflammation,gastric mucosa with mild chronic active inflammation;negative for intestinal metaplasia & dysplasia) intact fundoplicaiton,sm plaque like lesions in duodenum    UPPER GASTROINTESTINAL ENDOSCOPY N/A 12/19/2018    EGD BIOPSY performed by Melissa Mojica MD at [address]   [address]       Social History:    Social History     Tobacco Use    Smoking status: Never Smoker    Smokeless tobacco: Never Used   Substance Use Topics    Alcohol use: Yes     Comment: social                                Counseling given: Not Answered      Vital Signs (Current):   Vitals:    10/13/21 0800 10/13/21 0806   BP: 109/82    Pulse: 74    Resp: 16    Temp: 97.1 °F (36.2 °C)    SpO2: 96%    Weight:  135 lb (61.2 kg)   Height:  5' 7\" (1.702 m)                                              BP Readings from Last 3 Encounters:   10/13/21 109/82   08/31/21 123/79   08/04/21 134/86       NPO Status: Time of last liquid consumption: 2300                        Time of last solid consumption: 1900                        Date of last liquid consumption: 10/12/21                        Date of last solid food consumption: 10/11/21    BMI:   Wt Readings from Last 3 Encounters:   10/13/21 135 lb (61.2 kg)   08/31/21 131 lb (59.4 kg)   08/04/21 131 lb (59.4 kg)     Body mass index is 21.14 kg/m². CBC:   Lab Results   Component Value Date    WBC 6.5 02/24/2020    RBC 5.04 02/24/2020    HGB 15.8 02/24/2020    HCT 47.5 02/24/2020    MCV 94.2 02/24/2020    RDW 12.1 02/24/2020     02/24/2020       CMP:   Lab Results   Component Value Date    CREATININE 0.96 10/29/2018    LABGLOM >60 10/29/2018    GLUCOSE 82 08/20/2021       POC Tests: No results for input(s): POCGLU, POCNA, POCK, POCCL, POCBUN, POCHEMO, POCHCT in the last 72 hours.     Coags:   Lab Results   Component Value Date    PROTIME 11.5 10/29/2018    INR 1.0 10/29/2018       HCG (If Applicable):   Lab Results   Component Value Date    PREGTESTUR NEGATIVE 12/19/2018    HCG NEGATIVE 10/13/2021    HCGQUANT 445 (H) 04/17/2015        ABGs: No results found for: PHART, PO2ART, LEJ2FKV, ECA6GBH, BEART, N4TURCXI     Type & Screen (If Applicable):  No results found for: LABABO, LABRH    Drug/Infectious Status (If Applicable):  No results found for: HIV, HEPCAB    COVID-19 Screening (If Applicable):   Lab Results   Component Value Date    COVID19 Not Detected 10/11/2021           Anesthesia Evaluation  Patient summary reviewed and Nursing notes reviewed no history of anesthetic complications:   Airway: Mallampati: II  TM distance: >3 FB   Neck ROM: full  Mouth opening: > = 3 FB Dental: normal exam         Pulmonary:normal exam    (+) sleep apnea:                             Cardiovascular:  Exercise tolerance: good (>4 METS),       (-) past MI and CAD        Rate: normal                    Neuro/Psych:      (-) CVA           GI/Hepatic/Renal:   (+) GERD: poorly controlled,           Endo/Other:    (+) : arthritis:., .                 Abdominal:             Vascular: Other Findings:             Anesthesia Plan      MAC and general     ASA 3       Induction: intravenous. MIPS: prophylactic pharmacologic antiemetic agents not administered perioperatively for documented reasons. Anesthetic plan and risks discussed with patient. Plan discussed with CRNA.     Attending anesthesiologist reviewed and agrees with Preprocedure content              Shahrzad Freeman DO   10/13/2021

## 2021-10-13 NOTE — H&P
History and Physical Service   [facility]    HISTORY AND PHYSICAL EXAMINATION            Date of Evaluation: 10/13/2021  Patient name:  Eulalia Burt  MRN:   [id number]  YOB: 1975  PCP:    Nereida Smith MD    History Obtained From:     Patient, medical records    History of Present Illness: This is Eulalia Burt a 39 y.o. female who presents today for a EGD and colorectal cancer screening, not high risk by Dr. Donavan Pedraza for GERD and screening. Patient has complaints of chronic heartburn and episodes of choking on solid foods. Patient takes Protonix and uses Tums with no relief. Patient has had hiatal hernia repair (2018) and multiple EGDs in 2018 with biopsies. Patient has diarrhea alternating with constipation. She denies bloody tarry stools, nausea, vomiting, abdominal pain or unintentional weight loss. Patient followed bowel prep until watery clear. No previous colonoscopy. No FH colon cancer or polyps. Denies fever, chills, shortness of breath, cough, chest pain, open sores or wounds. Denies hx diabetes. Denies taking any blood thinning medications in the last 10 days.      Past Medical History:     Past Medical History:   Diagnosis Date    Acute superficial venous thrombosis of lower extremity     POSTPARTUM    Arthritis     GERD (gastroesophageal reflux disease)     Goiter     Postoperative nausea     Reflux esophagitis         Past Surgical History:     Past Surgical History:   Procedure Laterality Date    DILATION AND CURETTAGE OF UTERUS      ablation    DILATION AND CURETTAGE OF UTERUS N/A 2/28/2020    DILATATION AND CURETTAGE HYSTEROSCOPY CAUTERY ABLATION performed by Andriy Watson MD at [address] [place] [address]    Right 5th finger - tendon    HERNIA REPAIR  2010    HIATAL HERNIA REPAIR  10/29/2018    LAPAROSCOPIC ROBOTIC HIATAL HERNIA REPAIR WITH NISSEN FUNDOPLICATION    MA LAP, REPAIR PARAESOPHAGEAL HERNIA, INCL FUNDOPLASTY W/ MESH N/A 10/29/2018    HERNIA HIATAL LAPAROSCOPIC ROBOTIC WITH NISSEN FUNDOPLICATION performed by Em Li MD at [address] 2/14/2018    EGD BIOPSY performed by Michelle Kaur MD at [address] (grade 2 reflux esophagitis)    UPPER GASTROINTESTINAL ENDOSCOPY  4/19/2018    ESOPHAGEAL CAPSULE ENDOSCOPY performed by Armen Das MD at [address] [facility] N/A 4/19/2018    EGD BIOPSY performed by Armen Das MD at [address] [facility] ENDOSCOPY  12/19/2018    -bx's(duodenal-normal,antral-mild chronic inactive gastritis,esoph-mild active inflammation,gastric mucosa with mild chronic active inflammation;negative for intestinal metaplasia & dysplasia) intact fundoplicaiton,sm plaque like lesions in duodenum    UPPER GASTROINTESTINAL ENDOSCOPY N/A 12/19/2018    EGD BIOPSY performed by Michelle Kaur MD at [address]   [address]        Medications Prior to Admission:     Prior to Admission medications    Medication Sig Start Date End Date Taking? Authorizing Provider   polyethylene glycol (GLYCOLAX) 17 GM/SCOOP powder Follow instructions provided to you from physician's office. 8/31/21   Chelsea Bernard MD   bisacodyl (BISACODYL) 5 MG EC tablet Follow instructions provided given by the physician's office. 8/31/21   Chelsea Bernard MD   valACYclovir (VALTREX) 500 MG tablet Take 4 tablets by mouth 2 times daily For one day 6/4/21   Ebony Montemayor MD   pantoprazole (PROTONIX) 40 MG tablet Take 1 tablet by mouth daily 2/1/21   Ebony Montemayor MD   celecoxib (CELEBREX) 200 MG capsule Take 1 capsule by mouth 2 times daily as needed for Pain 2/25/20   Ebony Montemayor MD        Allergies:     Patient has no known allergies. Social History:     Tobacco:    reports that she has never smoked.  She has never used smokeless tobacco.  Alcohol:      reports current alcohol use. Drug Use:  reports no history of drug use. Family History:     Family History   Problem Relation Age of Onset    Heart Disease Mother         MVP    Hypertension Mother     Rheum Arthritis Father     Stroke Maternal Grandmother     Breast Cancer Paternal Grandmother     Other Other         No family h/o ovarian cancer. Review of Systems:     Positive and Negative as described in HPI. CONSTITUTIONAL:  negative for fevers, chills, sweats, fatigue, weight loss  HEENT:  negative for vision, hearing changes, runny nose, throat pain  RESPIRATORY:  negative for shortness of breath, cough, congestion, wheezing. CARDIOVASCULAR:  negative for chest pain, palpitations. GASTROINTESTINAL: See HPI  GENITOURINARY:  negative for difficulty of urination, burning with urination, frequency   INTEGUMENT: Easy bruising negative for rash, skin lesions  HEMATOLOGIC/LYMPHATIC:  negative for swelling/edema   ALLERGIC/IMMUNOLOGIC:  negative for urticaria , itching  ENDOCRINE: Borderline hyperthyroid  negative increase in drinking, increase in urination, hot or cold intolerance  MUSCULOSKELETAL: Arthritis negative joint pains, muscle aches, swelling of joints  NEUROLOGICAL:  negative for headaches, dizziness, lightheadedness, numbness, pain, tingling extremities  BEHAVIOR/PSYCH:  negative for depression, anxiety    Physical Exam:   /82   Pulse 74   Temp 97.1 °F (36.2 °C)   Resp 16   SpO2 96%   No LMP recorded. [id number]  No results for input(s): POCGLU in the last 72 hours. General Appearance:  alert, well appearing, and in no acute distress  Mental status: oriented to person, place, and time with normal affect  Head:  normocephalic, atraumatic.   Eye: no icterus, redness, pupils equal and reactive, extraocular eye movements intact, conjunctiva clear  Ear: normal external ear, no discharge, hearing intact  Nose:  no drainage noted  Mouth:

## 2021-10-14 LAB — SURGICAL PATHOLOGY REPORT: NORMAL

## 2021-11-22 ENCOUNTER — OFFICE VISIT (OUTPATIENT)
Dept: GASTROENTEROLOGY | Age: 46
End: 2021-11-22
Payer: COMMERCIAL

## 2021-11-22 VITALS
HEART RATE: 79 BPM | TEMPERATURE: 98.2 F | SYSTOLIC BLOOD PRESSURE: 121 MMHG | BODY MASS INDEX: 21.61 KG/M2 | DIASTOLIC BLOOD PRESSURE: 83 MMHG | WEIGHT: 138 LBS

## 2021-11-22 DIAGNOSIS — Z93.1 S/P NISSEN FUNDOPLICATION (WITH GASTROSTOMY TUBE PLACEMENT) (HCC): ICD-10-CM

## 2021-11-22 DIAGNOSIS — K63.5 HYPERPLASTIC POLYP OF ASCENDING COLON: Primary | ICD-10-CM

## 2021-11-22 DIAGNOSIS — K21.9 CHRONIC GERD: Chronic | ICD-10-CM

## 2021-11-22 DIAGNOSIS — K58.2 IRRITABLE BOWEL SYNDROME WITH BOTH CONSTIPATION AND DIARRHEA: ICD-10-CM

## 2021-11-22 PROCEDURE — 99214 OFFICE O/P EST MOD 30 MIN: CPT | Performed by: INTERNAL MEDICINE

## 2021-11-22 ASSESSMENT — ENCOUNTER SYMPTOMS
RECTAL PAIN: 0
VOICE CHANGE: 1
BLOOD IN STOOL: 0
COUGH: 0
ANAL BLEEDING: 0
DIARRHEA: 1
CHOKING: 1
VOMITING: 1
SORE THROAT: 1
ABDOMINAL DISTENTION: 1
SHORTNESS OF BREATH: 0
ABDOMINAL PAIN: 1
NAUSEA: 0
TROUBLE SWALLOWING: 1
CONSTIPATION: 1

## 2021-11-22 NOTE — PROGRESS NOTES
GI OFFICE FOLLOW UP    Jazmyn Mclean is a 55 y.o. female evaluated via on 11/22/2021. Consent:  She and/or health care decision maker is aware that that she may receive a bill for this telephone service, depending on her insurance coverage, and has provided verbal consent to proceed: YES      INTERVAL HISTORY:   No referring provider defined for this encounter. Chief Complaint   Patient presents with    Follow-up     Patient here to follow up on her colonoscopy and EGD / patient states she is feeling ok today her heartburn is still always there      Gastroesophageal Reflux       1. Hyperplastic polyp of ascending colon    2. Chronic GERD    3. S/P Nissen fundoplication (with gastrostomy tube placement) ([facility] [address]    4. Irritable bowel syndrome with both constipation and diarrhea       The patient is here as a follow up of her recent GI procedure. The results have been sent to you separately   The findings were explained to the patient in detail and biopsies were also discussed   with her    This patient had a recent EGD and colonoscopy by myself  She has history for some nonspecific throat irritation dysphagia and acid reflux had Niesen fundoplication done in the past    Patient was found to have some mild distal esophagitis  Small polyp in the fundic area evidence of Nissen fundoplication no evidence of obstructions lesions signs of eosinophilic esophagitis were noted    Patient seems to have some allergies?   Had some throat irritation    Denies nausea vomiting hematemesis      Her colon was very redundant had some retained stools a flat polyp was removed around hepatic flexure area which is hyperplastic on the biopsies she has no known family history for colon cancer    Has some mild irritable bowel syndrome-like symptoms abdominal bloating and gas symptoms    She is a cardiac rehab nurse in [employer]    Denies any rectal bleeding melanotic stool    HISTORY OF PRESENT ILLNESS: Chester Perez is a 55 y.o. female with a past history remarkable for , referred for evaluation of   Chief Complaint   Patient presents with    Follow-up     Patient here to follow up on her colonoscopy and EGD / patient states she is feeling ok today her heartburn is still always there      Gastroesophageal Reflux   . Past Medical,Family, and Social History reviewed and does contribute to the patient presenting condition. Patient's PMH/PSH,SH,PSYCH Hx, MEDs, ALLERGIES, and ROS were all reviewed and updated in the appropriate sections.     PAST MEDICAL HISTORY:  Past Medical History:   Diagnosis Date    Acute superficial venous thrombosis of lower extremity     POSTPARTUM    Arthritis     GERD (gastroesophageal reflux disease)     Goiter     Hx of blood clots     Superficial Right Leg    Postoperative nausea     Reflux esophagitis     Sleep apnea        Past Surgical History:   Procedure Laterality Date    COLONOSCOPY N/A 10/13/2021    COLONOSCOPY POLYPECTOMY HOT BIOPSY performed by Manuel Hughes MD at [address] [facility] [address] N/A 2/28/2020    DILATATION AND CURETTAGE HYSTEROSCOPY CAUTERY ABLATION performed by Dora Nair MD at [address] [facility]    Right 5th finger - tendon    HERNIA REPAIR  2010    HIATAL HERNIA REPAIR  10/29/2018    LAPAROSCOPIC ROBOTIC HIATAL HERNIA REPAIR WITH NISSEN FUNDOPLICATION    DE LAP, REPAIR PARAESOPHAGEAL HERNIA, INCL FUNDOPLASTY W/ MESH N/A 10/29/2018    HERNIA HIATAL LAPAROSCOPIC ROBOTIC WITH NISSEN FUNDOPLICATION performed by Raulito Neff MD at [address] [facility] 2/14/2018    EGD BIOPSY performed by Gisela Chan MD at [address] (grade 2 reflux esophagitis)    UPPER GASTROINTESTINAL ENDOSCOPY 4/19/2018    ESOPHAGEAL CAPSULE ENDOSCOPY performed by Johan Jolly MD at [facility] N/A 4/19/2018    EGD BIOPSY performed by Johan Jolly MD at [address] [facility] ENDOSCOPY  12/19/2018    -bx's(duodenal-normal,antral-mild chronic inactive gastritis,esoph-mild active inflammation,gastric mucosa with mild chronic active inflammation;negative for intestinal metaplasia & dysplasia) intact fundoplicaiton,sm plaque like lesions in duodenum    UPPER GASTROINTESTINAL ENDOSCOPY N/A 12/19/2018    EGD BIOPSY performed by Parris Kwong MD at [address] N/A 10/13/2021    EGD BIOPSY performed by Simin Valle MD at [address] [facility] Right 2006    WISDOM TOOTH EXTRACTION  1993       CURRENT MEDICATIONS:    Current Outpatient Medications:     Calcium-Magnesium-Zinc 500-250-12.5 MG TABS, Take 1 mg by mouth daily, Disp: , Rfl:     Saccharomyces boulardii (PROBIOTIC) 250 MG CAPS, Take 250 mg by mouth, Disp: , Rfl:     valACYclovir (VALTREX) 500 MG tablet, Take 4 tablets by mouth 2 times daily For one day, Disp: 24 tablet, Rfl: 3    pantoprazole (PROTONIX) 40 MG tablet, Take 1 tablet by mouth daily, Disp: 90 tablet, Rfl: 3    celecoxib (CELEBREX) 200 MG capsule, Take 1 capsule by mouth 2 times daily as needed for Pain, Disp: 180 capsule, Rfl: 1    polyethylene glycol (GLYCOLAX) 17 GM/SCOOP powder, Follow instructions provided to you from physician's office. (Patient not taking: Reported on 11/22/2021), Disp: 238 g, Rfl: 0    bisacodyl (BISACODYL) 5 MG EC tablet, Follow instructions provided given by the physician's office.  (Patient not taking: Reported on 11/22/2021), Disp: 4 tablet, Rfl: 0    ALLERGIES:   No Known Allergies    FAMILY HISTORY:       Problem Relation Age of Onset    Heart Disease Mother         MVP    Hypertension Mother     Rheum Arthritis Father     Stroke Maternal Grandmother     Breast Cancer Paternal Grandmother     Other Other         No family h/o ovarian cancer. SOCIAL HISTORY:   Social History     Socioeconomic History    Marital status:      Spouse name: Not on file    Number of children: Not on file    Years of education: Not on file    Highest education level: Not on file   Occupational History    Occupation: pharmacist     Employer: [employer]   Tobacco Use    Smoking status: Never Smoker    Smokeless tobacco: Never Used   Vaping Use    Vaping Use: Never used   Substance and Sexual Activity    Alcohol use: Yes     Comment: social    Drug use: No    Sexual activity: Yes     Partners: Male     Birth control/protection: Condom   Other Topics Concern    Not on file   Social History Narrative    Not on file     Social Determinants of Health     Financial Resource Strain: Low Risk     Difficulty of Paying Living Expenses: Not hard at all   Food Insecurity: No Food Insecurity    Worried About [address] in the Last Year: Never true    [facility] in the Last Year: Never true   Transportation Needs: No Transportation Needs    Lack of Transportation (Medical): No    Lack of Transportation (Non-Medical):  No   Physical Activity:     Days of Exercise per Week: Not on file    Minutes of Exercise per Session: Not on file   Stress:     Feeling of Stress : Not on file   Social Connections:     Frequency of Communication with Friends and Family: Not on file    Frequency of Social Gatherings with Friends and Family: Not on file    Attends Zoroastrian Services: Not on file    Active Member of Clubs or Organizations: Not on file    Attends Club or Organization Meetings: Not on file    Marital Status: Not on file   Intimate Partner Violence:     Fear of Current or Ex-Partner: Not on file    Emotionally Abused: Not on file    Physically Abused: Not on file    Sexually Abused: Not on file   Housing Stability:     Unable to Pay for Housing in the Last Year: Not on file    Number of Places Lived in the Last Year: Not on file    Unstable Housing in the Last Year: Not on file         REVIEW OF SYSTEMS:         Review of Systems   Constitutional: Negative for appetite change, fatigue and unexpected weight change. HENT: Positive for sore throat, trouble swallowing and voice change. Negative for dental problem. Eyes: Negative for visual disturbance. Respiratory: Positive for choking. Negative for cough and shortness of breath. Cardiovascular: Positive for chest pain. Negative for leg swelling. Gastrointestinal: Positive for abdominal distention, abdominal pain, constipation, diarrhea and vomiting. Negative for anal bleeding, blood in stool, nausea and rectal pain. Musculoskeletal: Negative for joint swelling and myalgias. Neurological: Negative. Hematological: Bruises/bleeds easily. Psychiatric/Behavioral: Positive for sleep disturbance. The patient is not nervous/anxious. PHYSICAL EXAMINATION: Vital signs reviewed per the nursing documentation. /83   Pulse 79   Temp 98.2 °F (36.8 °C)   Wt 138 lb (62.6 kg)   BMI 21.61 kg/m²   Body mass index is 21.61 kg/m². Physical Exam  Nursing note reviewed. Constitutional:       Appearance: She is well-developed. Comments: Anxious    HENT:      Head: Normocephalic and atraumatic. Eyes:      Conjunctiva/sclera: Conjunctivae normal.      Pupils: Pupils are equal, round, and reactive to light. Cardiovascular:      Heart sounds: Normal heart sounds. Pulmonary:      Effort: Pulmonary effort is normal.      Breath sounds: Normal breath sounds. Abdominal:      General: Bowel sounds are normal.      Palpations: Abdomen is soft. Comments: NON TENDER, NON DISTENTED  LIVER SPLEEN AND HERNIAS ARE NOT  PALPABLE  BOWEL SOUNDS ARE POSITIVE        Musculoskeletal:         General: Normal range of motion.       Cervical back: Normal range of motion and neck supple. Skin:     General: Skin is warm. Neurological:      Mental Status: She is alert and oriented to person, place, and time. Psychiatric:         Behavior: Behavior normal.           LABORATORY DATA: Reviewed  Lab Results   Component Value Date    WBC 6.5 02/24/2020    HGB 15.8 (H) 02/24/2020    HCT 47.5 (H) 02/24/2020    MCV 94.2 02/24/2020     02/24/2020    CREATININE 0.96 10/29/2018    INR 1.0 10/29/2018         Lab Results   Component Value Date    RBC 5.04 02/24/2020    HGB 15.8 (H) 02/24/2020    MCV 94.2 02/24/2020    MCH 31.3 02/24/2020    MCHC 33.3 02/24/2020    RDW 12.1 02/24/2020    MPV 10.0 02/24/2020    BASOPCT 1 02/24/2020    LYMPHSABS 1.65 02/24/2020    MONOSABS 0.55 02/24/2020    NEUTROABS 4.11 02/24/2020    EOSABS 0.07 02/24/2020    BASOSABS 0.04 02/24/2020         DIAGNOSTIC TESTING:     No results found. Assessment  1. Hyperplastic polyp of ascending colon    2. Chronic GERD    3. S/P Nissen fundoplication (with gastrostomy tube placement) ([facility] [address]    4. Irritable bowel syndrome with both constipation and diarrhea        Plan    Pt was asked to chew food well  Take time in eating  Sit up or prop up when eating  Don't talk when eating  Walk after finish eating  Use liquids with meals if has issues  The patient has verbalized understanding and agreemenet to this. Pt was discussed in detail about the possible side effects of proton pump inhibiter therapy. She was explained about the possibility of calcium and magnesium malabsorption and was advised to start taking calcium supplements with Vit D. Some over the counter regimens were explained to patient. Some dietary advices were also given. She has verbalized understanding and agreement to this. She may need to see an allergist because of her symptoms of throat irritation and previous history for allergies      Pt was advised in detail about some life style and dietary modifications.  She was advised about avoidance of caffeine, nicotine and chocolate. Pt was also told to stay away from any kind of fast foods, soda pops. She was also advised to avoid lots of spices, grease and fried food etc.     Instructions were also given about trying to arrange the timing, quality and quantity of food. Instructions were given about using ample amount of fiber including dietary and supplemental fiber either metamucil, bennafiber or citrucell etc.  Pt was advised about drinking ample amount of water without any colors or chemicals. Stress was given about regular exercise. Pt has verbalized understanding and agreement to these modifications. Pt was given instructions and advice in detail about the symptom of constipation. She was explained about avoidance of fast food, soda pops, cheese and red meat. Was also told to avoid sedatives narcotics and pain killers if possible. Pt was advised to start drinking ample amount of water and liquid. Was told to adapt and follow an exercise regimen. Instructions were given to increase the amount of fiber including dietary in terms of bran, cereals, whole wheat, brown bread etc. Was also instructed to start using supplemental fiber either Metamucil, citrucell or bennafiber with ample liquids. She was told to start drinking prune juice which is good for constipation. If symptoms don't resolve she will require medicines to assist with her symptoms    Pt has verbalized understanding and agreement to this plan. I will see her back in the summer next year    She was asked to call me if there is any problem or issues      I communicated with the patient and/or health care decision maker about   Details of this discussion including any medical advice provided:YES      I affirm this is a Patient Initiated Episode with an Established Patient who has not had a related appointment within my department in the past 7 days or scheduled within the next 24 hours.     Total Time: minutes: 21-30 minutes    Note: not billable if this call serves to triage the patient into an appointment for the relevant concern      Thank you for allowing me to participate in the care of Ms. Lona Gannon. For any further questions please do not hesitate to contact me. I have reviewed and agree with the ROS entered by the MA/LPN.          Hillary Newberry MD, [facility]  Board Certified in Gastroenterology and [address] [place] [address] Gastroenterology  Office #: [phone]

## 2021-11-29 ENCOUNTER — HOSPITAL ENCOUNTER (OUTPATIENT)
Dept: WOMENS IMAGING | Age: 46
Discharge: HOME | End: 2021-12-01
Payer: COMMERCIAL

## 2021-11-29 DIAGNOSIS — Z12.31 BREAST CANCER SCREENING BY MAMMOGRAM: ICD-10-CM

## 2021-11-29 PROCEDURE — 77063 BREAST TOMOSYNTHESIS BI: CPT

## 2021-12-02 ENCOUNTER — HOSPITAL ENCOUNTER (OUTPATIENT)
Dept: PHYSICAL THERAPY | Age: 46
Setting detail: THERAPIES SERIES
Discharge: HOME | End: 2021-12-02
Payer: COMMERCIAL

## 2021-12-02 PROCEDURE — L3020 FOOT LONGITUD/METATARSAL SUP: HCPCS

## 2021-12-02 NOTE — PROGRESS NOTES
Phone: [phone]        Fax: [phone]  [facility]  Physical Therapy  Orthotic Fitting Plan of Care  Date: 2021    Patient Name: Paco Shea          : 1975  (41 y.o.)  [facility] #: [id number]    Attending Physician: Dr. Katie Amor MD    Diagnosis: Bilateral bunions, [facility] [address]    Reason for Referral:  Patient reports pain \"like stress fractures\" over Bilateral bunions of great toes and reports she wants a pair of orthotics that will fit in her more comfortable shoes. Objective Assessment:  Patient ambulates with B shoe orthotics with mild antalgic gait d/t bilateral bunion pain. Patient with normal 2-3* B calcaneal eversion in standing, B high arches and intact bilateral windlass mechanism. Patient to benefit from fitting for custom orthotics to decrease B foot pain during gait and standing. Treatment:  [x]  Fitting for custom orthotics  [x]  Gait and foot analysis  []   Other:     Instructed Patient:   [x]  Proper fitting and follow up visit. [] Other:        Treatment Goals:   []  Met     []  Not Met              2021   [x]  Patient will be fitted for custom orthotics to be issued at a later date. Treatment Plan:   [x]  Assessment for and fitting of custom orthotics. Rehab Potential: []  Poor   []  Fair  [x]  Good   []  Excellent     If there are any questions regarding plan of care, please do not hesitate to contact the center. Thank you for your referral.      Therapists Signature: Obey Wood PT, PT, DPT    Date: 2021        To be completed by the referring physicians   By signing below, I agree to the above treatment plan.       Physicians Signature:                        Date: 2021

## 2022-01-12 ENCOUNTER — OFFICE VISIT (OUTPATIENT)
Dept: PRIMARY CARE CLINIC | Age: 47
End: 2022-01-12
Payer: COMMERCIAL

## 2022-01-12 VITALS
SYSTOLIC BLOOD PRESSURE: 100 MMHG | WEIGHT: 137 LBS | BODY MASS INDEX: 21.46 KG/M2 | HEART RATE: 109 BPM | RESPIRATION RATE: 18 BRPM | TEMPERATURE: 100.3 F | OXYGEN SATURATION: 96 % | DIASTOLIC BLOOD PRESSURE: 75 MMHG

## 2022-01-12 DIAGNOSIS — J02.9 SORE THROAT: ICD-10-CM

## 2022-01-12 DIAGNOSIS — J06.9 VIRAL URI WITH COUGH: Primary | ICD-10-CM

## 2022-01-12 DIAGNOSIS — R50.9 FEVER, UNSPECIFIED FEVER CAUSE: ICD-10-CM

## 2022-01-12 LAB
INFLUENZA A ANTIBODY: NEGATIVE
INFLUENZA B ANTIBODY: NEGATIVE
S PYO AG THROAT QL: NORMAL

## 2022-01-12 PROCEDURE — 99203 OFFICE O/P NEW LOW 30 MIN: CPT | Performed by: NURSE PRACTITIONER

## 2022-01-12 PROCEDURE — 87804 INFLUENZA ASSAY W/OPTIC: CPT | Performed by: NURSE PRACTITIONER

## 2022-01-12 PROCEDURE — 87880 STREP A ASSAY W/OPTIC: CPT | Performed by: NURSE PRACTITIONER

## 2022-01-12 ASSESSMENT — ENCOUNTER SYMPTOMS
ALLERGIC/IMMUNOLOGIC NEGATIVE: 1
SORE THROAT: 1
SWOLLEN GLANDS: 1
NAUSEA: 0
RESPIRATORY NEGATIVE: 1
RHINORRHEA: 1
EYES NEGATIVE: 1
COUGH: 0
GASTROINTESTINAL NEGATIVE: 1

## 2022-01-12 NOTE — PATIENT INSTRUCTIONS
SURVEY:     You may be receiving a survey from [employer] regarding your visit today. Please complete the survey to enable us to provide the highest quality of care to you and your family. If you cannot score us a very good on any question, please call the office to discuss how we could have made your experience a very good one. Thank you. Aide Manriquez, APRN-CNP  Mike Cho, CNP  Constantine Cháveztabitha, LPN  Lelia Marshall, LPN  Idalia Carreon, [facility]  Manuel Thomas, [facility]  Veronica, CMA  Cherie, PCA    Patient Education        Viral Respiratory Infection: Care Instructions  Your Care Instructions     Viruses are very small organisms. They grow in number after they enter your body. There are many types that cause different illnesses, such as colds and the mumps. The symptoms of a viral respiratory infection often start quickly. They include a fever, sore throat, and runny nose. You may also just not feel well. Or you may not want to eat much. Most viral respiratory infections are not serious. They usually get better with time and self-care. Antibiotics are not used to treat a viral infection. That's because antibiotics will not help cure a viral illness. In some cases, antiviral medicine can help your body fight a serious viral infection. Follow-up care is a key part of your treatment and safety. Be sure to make and go to all appointments, and call your doctor if you are having problems. It's also a good idea to know your test results and keep a list of the medicines you take. How can you care for yourself at home? · Rest as much as possible until you feel better. · Be safe with medicines. Take your medicine exactly as prescribed. Call your doctor if you think you are having a problem with your medicine. You will get more details on the specific medicine your doctor prescribes. · Take an over-the-counter pain medicine, such as acetaminophen (Tylenol), ibuprofen (Advil, Motrin), or naproxen (Aleve), as needed for pain and fever.  Read and follow all instructions on the label. Do not give aspirin to anyone younger than 20. It has been linked to Reye syndrome, a serious illness. · Drink plenty of fluids. Hot fluids, such as tea or soup, may help relieve congestion in your nose and throat. If you have kidney, heart, or liver disease and have to limit fluids, talk with your doctor before you increase the amount of fluids you drink. · Try to clear mucus from your lungs by breathing deeply and coughing. · Gargle with warm salt water once an hour. This can help reduce swelling and throat pain. Use 1 teaspoon of salt mixed in 1 cup of warm water. · Do not smoke or allow others to smoke around you. If you need help quitting, talk to your doctor about stop-smoking programs and medicines. These can increase your chances of quitting for good. To avoid spreading the virus  · Cough or sneeze into a tissue. Then throw the tissue away. · If you don't have a tissue, use your hand to cover your cough or sneeze. Then clean your hand. You can also cough into your sleeve. · Wash your hands often. Use soap and warm water. Wash for 15 to 20 seconds each time. · If you don't have soap and water near you, you can clean your hands with alcohol wipes or gel. When should you call for help? Call your doctor now or seek immediate medical care if:    · You have a new or higher fever.     · Your fever lasts more than 48 hours.     · You have trouble breathing.     · You have a fever with a stiff neck or a severe headache.     · You are sensitive to light.     · You feel very sleepy or confused. Watch closely for changes in your health, and be sure to contact your doctor if:    · You do not get better as expected. Where can you learn more? Go to https://[employer]ph[employer]guanakitoeb.[employer]. org and sign in to your vzaar account. Enter H964 in the [facility] box to learn more about \"Viral Respiratory Infection: Care Instructions. \"     If you do not have an account, please click on the \"Sign Up Now\" link. Current as of: July 6, 2021               Content Version: 13.1  © 2006-2021 [employer]wise[employer] Inc[employer]. Care instructions adapted under license by [facility] ([facility]). If you have questions about a medical condition or this instruction, always ask your healthcare professional. [address] any warranty or liability for your use of this information.

## 2022-01-12 NOTE — PROGRESS NOTES
[facility]  Roberth Ambrose, [facility]  [address] [address] [place] [address]  Dept: [phone]  Dept Fax: [phone]    Lucina Johnston is a 55 y.o. female who presents to the [facility] in Care today for her medical conditions/complaints as noted below. Lucina Johnston is c/o of Headache (x 1 day.), Pharyngitis (x 1 day. ), Congestion (x 1 day. ), and Hoarse (x 1 day. )      HPI:    Lucina Johnston is a 55 y.o. female who presents with  URI   This is a new problem. The current episode started yesterday. The problem has been gradually worsening. Maximum temperature: 100.3 today. Associated symptoms include congestion, ear pain (little), headaches, rhinorrhea, a sore throat and swollen glands. Pertinent negatives include no coughing or nausea. Associated symptoms comments: Body aches  . She has tried nothing for the symptoms. Past Medical History:   Diagnosis Date    Acute superficial venous thrombosis of lower extremity     POSTPARTUM    Arthritis     GERD (gastroesophageal reflux disease)     Goiter     Hx of blood clots     Superficial Right Leg    Postoperative nausea     Reflux esophagitis     Sleep apnea         Current Outpatient Medications   Medication Sig Dispense Refill    pantoprazole (PROTONIX) 40 MG tablet Take 1 tablet by mouth daily 90 tablet 3    Calcium-Magnesium-Zinc 500-250-12.5 MG TABS Take 1 mg by mouth daily      Saccharomyces boulardii (PROBIOTIC) 250 MG CAPS Take 250 mg by mouth      baclofen (LIORESAL) 10 MG tablet Take 1 tablet by mouth 3 times daily (Patient not taking: Reported on 1/12/2022) 21 tablet 0    polyethylene glycol (GLYCOLAX) 17 GM/SCOOP powder Follow instructions provided to you from physician's office. (Patient not taking: Reported on 11/22/2021) 238 g 0    bisacodyl (BISACODYL) 5 MG EC tablet Follow instructions provided given by the physician's office.  (Patient not taking: Reported on 11/22/2021) 4 tablet 0  valACYclovir (VALTREX) 500 MG tablet Take 4 tablets by mouth 2 times daily For one day (Patient not taking: Reported on 1/12/2022) 24 tablet 3    celecoxib (CELEBREX) 200 MG capsule Take 1 capsule by mouth 2 times daily as needed for Pain (Patient not taking: Reported on 1/12/2022) 180 capsule 1     No current facility-administered medications for this visit. No Known Allergies    Subjective:      Review of Systems   Constitutional: Positive for chills, fatigue and fever. Negative for appetite change. HENT: Positive for congestion, ear pain (little), rhinorrhea and sore throat. Eyes: Negative. Respiratory: Negative. Negative for cough. Cardiovascular: Negative. Gastrointestinal: Negative. Negative for nausea. Endocrine: Negative. Genitourinary: Negative. Musculoskeletal: Positive for myalgias. Skin: Negative. Allergic/Immunologic: Negative. Neurological: Positive for headaches. Hematological: Negative. Psychiatric/Behavioral: Negative. Objective:     Physical Exam  Vitals and nursing note reviewed. Constitutional:       Appearance: Normal appearance. HENT:      Head: Normocephalic. Right Ear: Tympanic membrane normal.      Left Ear: Tympanic membrane normal.      Nose: Rhinorrhea present. Rhinorrhea is clear. Mouth/Throat:      Lips: Pink. Mouth: Mucous membranes are moist.      Pharynx: Posterior oropharyngeal erythema present. Tonsils: No tonsillar exudate. 0 on the right. 0 on the left. Eyes:      Conjunctiva/sclera: Conjunctivae normal.      Pupils: Pupils are equal, round, and reactive to light. Cardiovascular:      Rate and Rhythm: Normal rate and regular rhythm. Heart sounds: Normal heart sounds. Pulmonary:      Effort: Pulmonary effort is normal.      Breath sounds: Normal breath sounds. Musculoskeletal:         General: Normal range of motion. Cervical back: Normal range of motion.    Lymphadenopathy: Cervical: Cervical adenopathy (anterior) present. Skin:     General: Skin is warm. Capillary Refill: Capillary refill takes less than 2 seconds. Neurological:      General: No focal deficit present. Mental Status: She is alert and oriented to person, place, and time. Psychiatric:         Mood and Affect: Mood normal.       /75 (Site: Left Upper Arm, Position: Sitting, Cuff Size: Large Adult)   Pulse 109   Temp 100.3 °F (37.9 °C) (Temporal)   Resp 18   Wt 137 lb (62.1 kg)   SpO2 96%   BMI 21.46 kg/m²     Assessment:      Diagnosis Orders   1. Viral URI with cough  COVID-19   2. Sore throat  POCT rapid strep A   3. Fever, unspecified fever cause  POCT Influenza A/B    COVID-19     Results for orders placed or performed in visit on 01/12/22   POCT rapid strep A   Result Value Ref Range    Strep A Ag None Detected None Detected   POCT Influenza A/B   Result Value Ref Range    Influenza A Ab NEGATIVE     Influenza B Ab NEGATIVE        Plan:   · Practice meticulous handwashing and cover cough to prevent spread of infection  · Encouraged to increase fluids and rest  · Tylenol/Ibuprofen OTC PRN for pain, discomfort or fever as directed on package  · Warm salt water gargles for sore throat  · Cool mist humidifier  · Will call with Covid test results. · Hot tea with honey and lemon for cough and sore throat PRN  · Patient instructions given for upper respiratory infection. · To ER or call 911 if any difficulty breathing, shortness of breath, inability to swallow, hives, rash, facial/tongue swelling or temp greater than 103 degrees. · Follow up with PCP or Walk in Care as needed if symptoms worsen or do not improve. Radha Peters is a [employer] employee. Covid test was ordered for her but she may chose to go through occupational health. No follow-ups on file. No orders of the defined types were placed in this encounter.        Electronically signed by LOPEZ Blackwell CNP on 1/13/2022 at 8:11 AM

## 2022-01-25 PROBLEM — H69.92 DYSFUNCTION OF LEFT EUSTACHIAN TUBE: Status: ACTIVE | Noted: 2022-01-25

## 2022-01-25 PROBLEM — H69.82 DYSFUNCTION OF LEFT EUSTACHIAN TUBE: Status: ACTIVE | Noted: 2022-01-25

## 2022-02-05 ENCOUNTER — HOSPITAL ENCOUNTER (INPATIENT)
Age: 47
LOS: 2 days | Discharge: HOME | DRG: 390 | End: 2022-02-07
Attending: EMERGENCY MEDICINE | Admitting: INTERNAL MEDICINE
Payer: COMMERCIAL

## 2022-02-05 ENCOUNTER — APPOINTMENT (OUTPATIENT)
Dept: CT IMAGING | Age: 47
DRG: 390 | End: 2022-02-05
Payer: COMMERCIAL

## 2022-02-05 DIAGNOSIS — K56.609 SMALL BOWEL OBSTRUCTION (HCC): Primary | ICD-10-CM

## 2022-02-05 LAB
-: NORMAL
ABSOLUTE EOS #: 0.05 K/UL (ref 0–0.44)
ABSOLUTE IMMATURE GRANULOCYTE: 0.03 K/UL (ref 0–0.3)
ABSOLUTE LYMPH #: 1.79 K/UL (ref 1.1–3.7)
ABSOLUTE MONO #: 0.48 K/UL (ref 0.1–1.2)
ALBUMIN SERPL-MCNC: 4.7 G/DL (ref 3.5–5.2)
ALBUMIN/GLOBULIN RATIO: 2 (ref 1–2.5)
ALP BLD-CCNC: 57 U/L (ref 35–104)
ALT SERPL-CCNC: 11 U/L (ref 5–33)
AMORPHOUS: NORMAL
ANION GAP SERPL CALCULATED.3IONS-SCNC: 9 MMOL/L (ref 9–17)
AST SERPL-CCNC: 15 U/L
BACTERIA: NORMAL
BASOPHILS # BLD: 0 % (ref 0–2)
BASOPHILS ABSOLUTE: 0.04 K/UL (ref 0–0.2)
BILIRUB SERPL-MCNC: 0.54 MG/DL (ref 0.3–1.2)
BILIRUBIN URINE: NEGATIVE
BUN BLDV-MCNC: 12 MG/DL (ref 6–20)
BUN/CREAT BLD: 15 (ref 9–20)
CALCIUM SERPL-MCNC: 10.1 MG/DL (ref 8.6–10.4)
CASTS UA: NORMAL /LPF
CHLORIDE BLD-SCNC: 101 MMOL/L (ref 98–107)
CO2: 27 MMOL/L (ref 20–31)
COLOR: YELLOW
COMMENT UA: ABNORMAL
CREAT SERPL-MCNC: 0.79 MG/DL (ref 0.5–0.9)
CRYSTALS, UA: NORMAL /HPF
DIFFERENTIAL TYPE: ABNORMAL
EKG ATRIAL RATE: 71 BPM
EKG P AXIS: 64 DEGREES
EKG P-R INTERVAL: 130 MS
EKG Q-T INTERVAL: 394 MS
EKG QRS DURATION: 76 MS
EKG QTC CALCULATION (BAZETT): 428 MS
EKG R AXIS: -7 DEGREES
EKG T AXIS: 24 DEGREES
EKG VENTRICULAR RATE: 71 BPM
EOSINOPHILS RELATIVE PERCENT: 1 % (ref 1–4)
EPITHELIAL CELLS UA: NORMAL /HPF (ref 0–25)
GFR AFRICAN AMERICAN: >60 ML/MIN
GFR NON-AFRICAN AMERICAN: >60 ML/MIN
GFR SERPL CREATININE-BSD FRML MDRD: ABNORMAL ML/MIN/{1.73_M2}
GFR SERPL CREATININE-BSD FRML MDRD: ABNORMAL ML/MIN/{1.73_M2}
GLUCOSE BLD-MCNC: 129 MG/DL (ref 70–99)
GLUCOSE URINE: NEGATIVE
HCG QUALITATIVE: NEGATIVE
HCT VFR BLD CALC: 46.5 % (ref 36.3–47.1)
HEMOGLOBIN: 16.2 G/DL (ref 11.9–15.1)
IMMATURE GRANULOCYTES: 0 %
KETONES, URINE: ABNORMAL
LEUKOCYTE ESTERASE, URINE: NEGATIVE
LIPASE: 20 U/L (ref 13–60)
LYMPHOCYTES # BLD: 19 % (ref 24–43)
MCH RBC QN AUTO: 31.5 PG (ref 25.2–33.5)
MCHC RBC AUTO-ENTMCNC: 34.8 G/DL (ref 28.4–34.8)
MCV RBC AUTO: 90.3 FL (ref 82.6–102.9)
MONOCYTES # BLD: 5 % (ref 3–12)
MUCUS: NORMAL
NITRITE, URINE: NEGATIVE
NRBC AUTOMATED: 0 PER 100 WBC
OTHER OBSERVATIONS UA: NORMAL
PDW BLD-RTO: 12.3 % (ref 11.8–14.4)
PH UA: 5 (ref 5–9)
PLATELET # BLD: 253 K/UL (ref 138–453)
PLATELET ESTIMATE: ABNORMAL
PMV BLD AUTO: 10 FL (ref 8.1–13.5)
POTASSIUM SERPL-SCNC: 4.1 MMOL/L (ref 3.7–5.3)
PROTEIN UA: NEGATIVE
RBC # BLD: 5.15 M/UL (ref 3.95–5.11)
RBC # BLD: ABNORMAL 10*6/UL
RBC UA: NORMAL /HPF (ref 0–2)
RENAL EPITHELIAL, UA: NORMAL /HPF
SEG NEUTROPHILS: 75 % (ref 36–65)
SEGMENTED NEUTROPHILS ABSOLUTE COUNT: 6.9 K/UL (ref 1.5–8.1)
SODIUM BLD-SCNC: 137 MMOL/L (ref 135–144)
SPECIFIC GRAVITY UA: 1.01 (ref 1.01–1.02)
TOTAL PROTEIN: 7.1 G/DL (ref 6.4–8.3)
TRICHOMONAS: NORMAL
TROPONIN INTERP: NORMAL
TROPONIN T: NORMAL NG/ML
TROPONIN, HIGH SENSITIVITY: 6 NG/L (ref 0–14)
TURBIDITY: CLEAR
URINE HGB: NEGATIVE
UROBILINOGEN, URINE: NORMAL
WBC # BLD: 9.3 K/UL (ref 3.5–11.3)
WBC # BLD: ABNORMAL 10*3/UL
WBC UA: NORMAL /HPF (ref 0–5)
YEAST: NORMAL

## 2022-02-05 PROCEDURE — 84484 ASSAY OF TROPONIN QUANT: CPT

## 2022-02-05 PROCEDURE — 6360000002 HC RX W HCPCS: Performed by: EMERGENCY MEDICINE

## 2022-02-05 PROCEDURE — 96375 TX/PRO/DX INJ NEW DRUG ADDON: CPT

## 2022-02-05 PROCEDURE — 99283 EMERGENCY DEPT VISIT LOW MDM: CPT

## 2022-02-05 PROCEDURE — 93010 ELECTROCARDIOGRAM REPORT: CPT | Performed by: FAMILY MEDICINE

## 2022-02-05 PROCEDURE — 96376 TX/PRO/DX INJ SAME DRUG ADON: CPT

## 2022-02-05 PROCEDURE — A4216 STERILE WATER/SALINE, 10 ML: HCPCS | Performed by: INTERNAL MEDICINE

## 2022-02-05 PROCEDURE — 2580000003 HC RX 258: Performed by: EMERGENCY MEDICINE

## 2022-02-05 PROCEDURE — 94761 N-INVAS EAR/PLS OXIMETRY MLT: CPT

## 2022-02-05 PROCEDURE — 83690 ASSAY OF LIPASE: CPT

## 2022-02-05 PROCEDURE — 96374 THER/PROPH/DIAG INJ IV PUSH: CPT

## 2022-02-05 PROCEDURE — 2500000003 HC RX 250 WO HCPCS: Performed by: EMERGENCY MEDICINE

## 2022-02-05 PROCEDURE — 6360000004 HC RX CONTRAST MEDICATION: Performed by: EMERGENCY MEDICINE

## 2022-02-05 PROCEDURE — C9113 INJ PANTOPRAZOLE SODIUM, VIA: HCPCS | Performed by: INTERNAL MEDICINE

## 2022-02-05 PROCEDURE — 1200000000 HC SEMI PRIVATE

## 2022-02-05 PROCEDURE — 85025 COMPLETE CBC W/AUTO DIFF WBC: CPT

## 2022-02-05 PROCEDURE — 74177 CT ABD & PELVIS W/CONTRAST: CPT

## 2022-02-05 PROCEDURE — 36415 COLL VENOUS BLD VENIPUNCTURE: CPT

## 2022-02-05 PROCEDURE — 80053 COMPREHEN METABOLIC PANEL: CPT

## 2022-02-05 PROCEDURE — 6360000002 HC RX W HCPCS: Performed by: INTERNAL MEDICINE

## 2022-02-05 PROCEDURE — 2580000003 HC RX 258: Performed by: INTERNAL MEDICINE

## 2022-02-05 PROCEDURE — 93005 ELECTROCARDIOGRAM TRACING: CPT | Performed by: EMERGENCY MEDICINE

## 2022-02-05 PROCEDURE — 84703 CHORIONIC GONADOTROPIN ASSAY: CPT

## 2022-02-05 PROCEDURE — 81001 URINALYSIS AUTO W/SCOPE: CPT

## 2022-02-05 RX ORDER — SODIUM CHLORIDE 9 MG/ML
10 INJECTION INTRAVENOUS DAILY
Status: DISCONTINUED | OUTPATIENT
Start: 2022-02-05 | End: 2022-02-07

## 2022-02-05 RX ORDER — 0.9 % SODIUM CHLORIDE 0.9 %
1000 INTRAVENOUS SOLUTION INTRAVENOUS ONCE
Status: COMPLETED | OUTPATIENT
Start: 2022-02-05 | End: 2022-02-05

## 2022-02-05 RX ORDER — PANTOPRAZOLE SODIUM 40 MG/10ML
40 INJECTION, POWDER, LYOPHILIZED, FOR SOLUTION INTRAVENOUS DAILY
Status: DISCONTINUED | OUTPATIENT
Start: 2022-02-05 | End: 2022-02-05 | Stop reason: SDUPTHER

## 2022-02-05 RX ORDER — SODIUM CHLORIDE 9 MG/ML
25 INJECTION, SOLUTION INTRAVENOUS PRN
Status: DISCONTINUED | OUTPATIENT
Start: 2022-02-05 | End: 2022-02-07 | Stop reason: HOSPADM

## 2022-02-05 RX ORDER — SODIUM CHLORIDE 9 MG/ML
INJECTION, SOLUTION INTRAVENOUS CONTINUOUS
Status: DISCONTINUED | OUTPATIENT
Start: 2022-02-05 | End: 2022-02-07

## 2022-02-05 RX ORDER — LIDOCAINE HYDROCHLORIDE 20 MG/ML
15 SOLUTION OROPHARYNGEAL
Status: DISCONTINUED | OUTPATIENT
Start: 2022-02-05 | End: 2022-02-07 | Stop reason: HOSPADM

## 2022-02-05 RX ORDER — MORPHINE SULFATE 4 MG/ML
4 INJECTION, SOLUTION INTRAMUSCULAR; INTRAVENOUS
Status: DISCONTINUED | OUTPATIENT
Start: 2022-02-05 | End: 2022-02-07 | Stop reason: HOSPADM

## 2022-02-05 RX ORDER — MORPHINE SULFATE 2 MG/ML
2 INJECTION, SOLUTION INTRAMUSCULAR; INTRAVENOUS
Status: DISCONTINUED | OUTPATIENT
Start: 2022-02-05 | End: 2022-02-07 | Stop reason: HOSPADM

## 2022-02-05 RX ORDER — FENTANYL CITRATE 50 UG/ML
50 INJECTION, SOLUTION INTRAMUSCULAR; INTRAVENOUS ONCE
Status: COMPLETED | OUTPATIENT
Start: 2022-02-05 | End: 2022-02-05

## 2022-02-05 RX ORDER — ONDANSETRON 4 MG/1
4 TABLET, ORALLY DISINTEGRATING ORAL EVERY 8 HOURS PRN
Status: DISCONTINUED | OUTPATIENT
Start: 2022-02-05 | End: 2022-02-07 | Stop reason: HOSPADM

## 2022-02-05 RX ORDER — SODIUM CHLORIDE 0.9 % (FLUSH) 0.9 %
10 SYRINGE (ML) INJECTION EVERY 12 HOURS SCHEDULED
Status: DISCONTINUED | OUTPATIENT
Start: 2022-02-05 | End: 2022-02-07 | Stop reason: HOSPADM

## 2022-02-05 RX ORDER — FENTANYL CITRATE 50 UG/ML
75 INJECTION, SOLUTION INTRAMUSCULAR; INTRAVENOUS ONCE
Status: COMPLETED | OUTPATIENT
Start: 2022-02-05 | End: 2022-02-05

## 2022-02-05 RX ORDER — SODIUM CHLORIDE 0.9 % (FLUSH) 0.9 %
10 SYRINGE (ML) INJECTION PRN
Status: DISCONTINUED | OUTPATIENT
Start: 2022-02-05 | End: 2022-02-07 | Stop reason: HOSPADM

## 2022-02-05 RX ORDER — ONDANSETRON 2 MG/ML
4 INJECTION INTRAMUSCULAR; INTRAVENOUS ONCE
Status: COMPLETED | OUTPATIENT
Start: 2022-02-05 | End: 2022-02-05

## 2022-02-05 RX ORDER — ONDANSETRON 2 MG/ML
4 INJECTION INTRAMUSCULAR; INTRAVENOUS EVERY 6 HOURS PRN
Status: DISCONTINUED | OUTPATIENT
Start: 2022-02-05 | End: 2022-02-07 | Stop reason: HOSPADM

## 2022-02-05 RX ORDER — PANTOPRAZOLE SODIUM 40 MG/10ML
40 INJECTION, POWDER, LYOPHILIZED, FOR SOLUTION INTRAVENOUS DAILY
Status: DISCONTINUED | OUTPATIENT
Start: 2022-02-05 | End: 2022-02-07

## 2022-02-05 RX ADMIN — FENTANYL CITRATE 50 MCG: 50 INJECTION, SOLUTION INTRAMUSCULAR; INTRAVENOUS at 16:18

## 2022-02-05 RX ADMIN — ENOXAPARIN SODIUM 40 MG: 100 INJECTION SUBCUTANEOUS at 19:16

## 2022-02-05 RX ADMIN — SODIUM CHLORIDE 10 ML: 9 INJECTION INTRAMUSCULAR; INTRAVENOUS; SUBCUTANEOUS at 19:17

## 2022-02-05 RX ADMIN — ONDANSETRON 4 MG: 2 INJECTION INTRAMUSCULAR; INTRAVENOUS at 14:48

## 2022-02-05 RX ADMIN — ONDANSETRON 4 MG: 2 INJECTION INTRAMUSCULAR; INTRAVENOUS at 19:16

## 2022-02-05 RX ADMIN — ONDANSETRON 4 MG: 2 INJECTION INTRAMUSCULAR; INTRAVENOUS at 18:00

## 2022-02-05 RX ADMIN — PANTOPRAZOLE SODIUM 40 MG: 40 INJECTION, POWDER, FOR SOLUTION INTRAVENOUS at 19:16

## 2022-02-05 RX ADMIN — FAMOTIDINE 20 MG: 10 INJECTION, SOLUTION INTRAVENOUS at 14:49

## 2022-02-05 RX ADMIN — SODIUM CHLORIDE: 9 INJECTION, SOLUTION INTRAVENOUS at 19:00

## 2022-02-05 RX ADMIN — FENTANYL CITRATE 75 MCG: 50 INJECTION, SOLUTION INTRAMUSCULAR; INTRAVENOUS at 18:00

## 2022-02-05 RX ADMIN — IOPAMIDOL 75 ML: 755 INJECTION, SOLUTION INTRAVENOUS at 15:45

## 2022-02-05 RX ADMIN — FENTANYL CITRATE 50 MCG: 50 INJECTION, SOLUTION INTRAMUSCULAR; INTRAVENOUS at 15:41

## 2022-02-05 RX ADMIN — FENTANYL CITRATE 50 MCG: 50 INJECTION, SOLUTION INTRAMUSCULAR; INTRAVENOUS at 14:50

## 2022-02-05 RX ADMIN — MORPHINE SULFATE 4 MG: 4 INJECTION, SOLUTION INTRAMUSCULAR; INTRAVENOUS at 19:16

## 2022-02-05 RX ADMIN — SODIUM CHLORIDE 975 ML: 9 INJECTION, SOLUTION INTRAVENOUS at 14:48

## 2022-02-05 ASSESSMENT — PAIN DESCRIPTION - DESCRIPTORS
DESCRIPTORS: SHARP

## 2022-02-05 ASSESSMENT — PAIN SCALES - GENERAL
PAINLEVEL_OUTOF10: 9
PAINLEVEL_OUTOF10: 8
PAINLEVEL_OUTOF10: 4
PAINLEVEL_OUTOF10: 9
PAINLEVEL_OUTOF10: 3
PAINLEVEL_OUTOF10: 8
PAINLEVEL_OUTOF10: 9
PAINLEVEL_OUTOF10: 9

## 2022-02-05 ASSESSMENT — PAIN DESCRIPTION - ONSET
ONSET: GRADUAL
ONSET: ON-GOING

## 2022-02-05 ASSESSMENT — PAIN DESCRIPTION - ORIENTATION
ORIENTATION: LOWER

## 2022-02-05 ASSESSMENT — PAIN DESCRIPTION - PAIN TYPE
TYPE: ACUTE PAIN

## 2022-02-05 ASSESSMENT — PAIN DESCRIPTION - FREQUENCY
FREQUENCY: INTERMITTENT
FREQUENCY: INTERMITTENT

## 2022-02-05 ASSESSMENT — PAIN DESCRIPTION - LOCATION
LOCATION: CHEST
LOCATION: ABDOMEN;BACK
LOCATION: CHEST;BACK

## 2022-02-05 NOTE — ED NOTES
Dr Margo Acosta Surgery on call called on cell and connected with Dr Xiong Holiday  02/05/22 [phone]

## 2022-02-05 NOTE — ED PROVIDER NOTES
[facility]  [facility]   Chief Complaint   Patient presents with    Chest Pain     started 3 hours ago, lower chest, epigastric, taking Tums without relief    Abdominal Pain      HPI   Ernie Joiner is a 55 y.o. female who presents with acute onset of abdominal pain 3 hours ago. She was previously well. She has a surgical history of having some hernias repaired 10 to 12 years ago as well as a Nissen fundoplication for reflux. This pain is approximately 9. There is no vomiting. She did have a bowel movement earlier today. No other current complaints. She has not had anything for pain and it is pretty severe. REVIEW OF SYSTEMS   GI: Patient complains of abdominal pain  Cardiac: No chest pain or syncope  Pulmonary: No difficulty breathing or new cough  General: No fevers  : No hematuria or dysuria  See HPI for further details. All other review of systems otherwise negative.      PAST MEDICAL & SURGICAL HISTORY   Past Medical History:   Diagnosis Date    Acute superficial venous thrombosis of lower extremity     POSTPARTUM    Arthritis     COVID-19 virus infection /  2021 2021    GERD (gastroesophageal reflux disease)     Goiter     Hx of blood clots     Superficial Right Leg    Postoperative nausea     Reflux esophagitis     Sleep apnea      Past Surgical History:   Procedure Laterality Date    COLONOSCOPY N/A 10/13/2021    COLONOSCOPY POLYPECTOMY HOT BIOPSY performed by Joaquin Hennessy MD at [address] [facility] [address] OF UTERUS N/A 2/28/2020    DILATATION AND CURETTAGE HYSTEROSCOPY CAUTERY ABLATION performed by Omid Mcpherson MD at [facility]    Right 5th finger - tendon    HERNIA REPAIR  2010    HIATAL HERNIA REPAIR  10/29/2018    LAPAROSCOPIC ROBOTIC HIATAL HERNIA REPAIR WITH NISSEN FUNDOPLICATION    [place] LAP, REPAIR PARAESOPHAGEAL HERNIA, INCL FUNDOPLASTY W/ MESH N/A 10/29/2018    HERNIA HIATAL LAPAROSCOPIC ROBOTIC WITH NISSEN FUNDOPLICATION performed by Nile Shelton MD at [facility] N/A 2/14/2018    EGD BIOPSY performed by Christy Cabrera MD at [address] (grade 2 reflux esophagitis)    UPPER GASTROINTESTINAL ENDOSCOPY  4/19/2018    ESOPHAGEAL CAPSULE ENDOSCOPY performed by Artie Jimenez MD at [facility] N/A 4/19/2018    EGD BIOPSY performed by Artie Jimenez MD at [address] [facility] ENDOSCOPY  12/19/2018    -bx's(duodenal-normal,antral-mild chronic inactive gastritis,esoph-mild active inflammation,gastric mucosa with mild chronic active inflammation;negative for intestinal metaplasia & dysplasia) intact fundoplicaiton,sm plaque like lesions in duodenum    UPPER GASTROINTESTINAL ENDOSCOPY N/A 12/19/2018    EGD BIOPSY performed by Christy Cabrera MD at [address] [place] [address] 10/13/2021    EGD BIOPSY performed by Thad Man MD at [address] [place] [address]   Current Outpatient Rx   Medication Sig Dispense Refill    pantoprazole (PROTONIX) 40 MG tablet Take 1 tablet by mouth daily 90 tablet 3    valACYclovir (VALTREX) 500 MG tablet Take 4 tablets by mouth 2 times daily For one day 24 tablet 3    Calcium-Magnesium-Zinc 500-250-12.5 MG TABS Take 1 mg by mouth daily      Saccharomyces boulardii (PROBIOTIC) 250 MG CAPS Take 250 mg by mouth      polyethylene glycol (GLYCOLAX) 17 GM/SCOOP powder Follow instructions provided to you from physician's office.  238 g 0    celecoxib (CELEBREX) 200 MG capsule Take 1 capsule by mouth 2 times daily as needed for Pain 180 capsule 1      ALLERGIES   No Known Allergies   SOCIAL AND FAMILY HISTORY   Social History     Socioeconomic History    Marital status:      Spouse name: None    Number of children: None    Years of education: None    Highest education level: None   Occupational History    Occupation: pharmacist     Employer: [employer]   Tobacco Use    Smoking status: Never Smoker    Smokeless tobacco: Never Used   Vaping Use    Vaping Use: Never used   Substance and Sexual Activity    Alcohol use: Yes     Comment: social    Drug use: No    Sexual activity: Yes     Partners: Male     Birth control/protection: Condom   Other Topics Concern    None   Social History Narrative    None     Social Determinants of Health     Financial Resource Strain: Low Risk     Difficulty of Paying Living Expenses: Not hard at all   Food Insecurity: No Food Insecurity    Worried About Running Out of Food in the Last Year: Never true    [address] in the Last Year: Never true   Transportation Needs: No Transportation Needs    Lack of Transportation (Medical): No    Lack of Transportation (Non-Medical):  No   Physical Activity:     Days of Exercise per Week: Not on file    Minutes of Exercise per Session: Not on file   Stress:     Feeling of Stress : Not on file   Social Connections:     Frequency of Communication with Friends and Family: Not on file    Frequency of Social Gatherings with Friends and Family: Not on file    Attends Hinduism Services: Not on file    Active Member of [address] [place] [address] or Organizations: Not on file    Attends Club or Organization Meetings: Not on file    Marital Status: Not on file   Intimate Partner Violence:     Fear of Current or Ex-Partner: Not on file    Emotionally Abused: Not on file    Physically Abused: Not on file    Sexually Abused: Not on file   Housing Stability:     Unable to Pay for Housing in the Last Year: Not on file    Number of [place] in the Last Year: Not on file    Unstable Housing in the Last Year: Not on file     Family History   Problem Relation Age of Onset    Heart Disease Mother         MVP    Hypertension Mother     Rheum Arthritis Father     Stroke Maternal Grandmother     Breast Cancer Paternal Grandmother     Other Other         No family h/o ovarian cancer. PHYSICAL EXAM   VITAL SIGNS: BP (!) 139/94   Pulse 70   Temp 98.2 °F (36.8 °C)   Resp 11   Ht 5' 7\" (1.702 m)   Wt 135 lb (61.2 kg)   SpO2 96%   BMI 21.14 kg/m²   Constitutional: Well developed, well nourished  Eyes: Sclera nonicteric, conjunctiva moist  HENT: Atraumatic, nose normal  Neck: Supple, no JVD  Respiratory: No retractions, normal breath sounds   Cardiovascular: Normal rate, normal rhythm, no murmurs  GI: Soft, RLQ / epigastric abdominal tenderness,, no distention. Musculoskeletal: No edema, no deformity   Vascular: ext warm and well perfused  Integument: No rash, dry skin  Neurologic: Alert & oriented, normal speech  Psychiatric: Cooperative, pleasant affect     RADIOLOGY/PROCEDURES   CT ABDOMEN PELVIS W IV CONTRAST Additional Contrast? None   Final Result   Findings suggestive of a small-bowel obstruction, possibly partial   originating in the ileum. RECOMMENDATIONS:   Surgical consultation           ED COURSE & MEDICAL DECISION MAKING   Pertinent Labs & Imaging studies reviewed and interpreted. (See chart for details)   See EMR for medications prescribed  Vitals:    02/05/22 1616   BP: (!) 139/94   Pulse: 70   Resp: 11   Temp:    SpO2: 96%     Differential diagnosis: Abdominal Aortic Aneurysm, Ischemic Bowel, Bowel Obstruction, Acute Cholecystitis, Acute Appendicitis, other     MDM: Patient on CAT scan about obstruction. Spoke with surgeon Dr. Curt Oneil as well as hospitalist Dr. Young Elder. Patient will be admitted for further management    FINAL IMPRESSION   1.  Small bowel obstruction [facility])        PLAN  admit  Electronically signed by: Sandra Daniels MD, 2/5/2022 5:14 PM  (This note was completed with a voice recognition program)           Mann Wheeler MD  02/05/22 [id number]

## 2022-02-05 NOTE — LETTER
[facility] MED SURG  [facility] [address]  Phone: [phone]    No name on file. February 7, 2022     Patient: Brisa Mason   YOB: 1975   Date of Visit: 2/5/2022       To Whom It May Concern: It is my medical opinion that Olinda Santana may return to work on 2/9/2022. .    If you have any questions or concerns, please don't hesitate to call. Sincerely,        No name on file.

## 2022-02-06 LAB
ABSOLUTE EOS #: 0.06 K/UL (ref 0–0.44)
ABSOLUTE IMMATURE GRANULOCYTE: <0.03 K/UL (ref 0–0.3)
ABSOLUTE LYMPH #: 1.93 K/UL (ref 1.1–3.7)
ABSOLUTE MONO #: 0.6 K/UL (ref 0.1–1.2)
ANION GAP SERPL CALCULATED.3IONS-SCNC: 6 MMOL/L (ref 9–17)
BASOPHILS # BLD: 0 % (ref 0–2)
BASOPHILS ABSOLUTE: 0.03 K/UL (ref 0–0.2)
BUN BLDV-MCNC: 10 MG/DL (ref 6–20)
BUN/CREAT BLD: 13 (ref 9–20)
CALCIUM SERPL-MCNC: 8.6 MG/DL (ref 8.6–10.4)
CHLORIDE BLD-SCNC: 103 MMOL/L (ref 98–107)
CO2: 25 MMOL/L (ref 20–31)
CREAT SERPL-MCNC: 0.78 MG/DL (ref 0.5–0.9)
DIFFERENTIAL TYPE: ABNORMAL
EOSINOPHILS RELATIVE PERCENT: 1 % (ref 1–4)
GFR AFRICAN AMERICAN: >60 ML/MIN
GFR NON-AFRICAN AMERICAN: >60 ML/MIN
GFR SERPL CREATININE-BSD FRML MDRD: ABNORMAL ML/MIN/{1.73_M2}
GFR SERPL CREATININE-BSD FRML MDRD: ABNORMAL ML/MIN/{1.73_M2}
GLUCOSE BLD-MCNC: 90 MG/DL (ref 70–99)
HCT VFR BLD CALC: 41.8 % (ref 36.3–47.1)
HEMOGLOBIN: 14.3 G/DL (ref 11.9–15.1)
IMMATURE GRANULOCYTES: 0 %
LYMPHOCYTES # BLD: 21 % (ref 24–43)
MCH RBC QN AUTO: 31.5 PG (ref 25.2–33.5)
MCHC RBC AUTO-ENTMCNC: 34.2 G/DL (ref 28.4–34.8)
MCV RBC AUTO: 92.1 FL (ref 82.6–102.9)
MONOCYTES # BLD: 7 % (ref 3–12)
NRBC AUTOMATED: 0 PER 100 WBC
PDW BLD-RTO: 12.7 % (ref 11.8–14.4)
PLATELET # BLD: 209 K/UL (ref 138–453)
PLATELET ESTIMATE: ABNORMAL
PMV BLD AUTO: 9.9 FL (ref 8.1–13.5)
POTASSIUM SERPL-SCNC: 4.1 MMOL/L (ref 3.7–5.3)
RBC # BLD: 4.54 M/UL (ref 3.95–5.11)
RBC # BLD: ABNORMAL 10*6/UL
SEG NEUTROPHILS: 71 % (ref 36–65)
SEGMENTED NEUTROPHILS ABSOLUTE COUNT: 6.36 K/UL (ref 1.5–8.1)
SODIUM BLD-SCNC: 134 MMOL/L (ref 135–144)
WBC # BLD: 9 K/UL (ref 3.5–11.3)
WBC # BLD: ABNORMAL 10*3/UL

## 2022-02-06 PROCEDURE — 80048 BASIC METABOLIC PNL TOTAL CA: CPT

## 2022-02-06 PROCEDURE — 36415 COLL VENOUS BLD VENIPUNCTURE: CPT

## 2022-02-06 PROCEDURE — 85025 COMPLETE CBC W/AUTO DIFF WBC: CPT

## 2022-02-06 PROCEDURE — A4216 STERILE WATER/SALINE, 10 ML: HCPCS | Performed by: INTERNAL MEDICINE

## 2022-02-06 PROCEDURE — 6370000000 HC RX 637 (ALT 250 FOR IP): Performed by: INTERNAL MEDICINE

## 2022-02-06 PROCEDURE — 1200000000 HC SEMI PRIVATE

## 2022-02-06 PROCEDURE — 2580000003 HC RX 258: Performed by: INTERNAL MEDICINE

## 2022-02-06 PROCEDURE — C9113 INJ PANTOPRAZOLE SODIUM, VIA: HCPCS | Performed by: INTERNAL MEDICINE

## 2022-02-06 PROCEDURE — 99221 1ST HOSP IP/OBS SF/LOW 40: CPT | Performed by: SURGERY

## 2022-02-06 PROCEDURE — 94761 N-INVAS EAR/PLS OXIMETRY MLT: CPT

## 2022-02-06 PROCEDURE — 6360000002 HC RX W HCPCS: Performed by: INTERNAL MEDICINE

## 2022-02-06 RX ORDER — ACETAMINOPHEN 325 MG/1
650 TABLET ORAL EVERY 6 HOURS PRN
Status: DISCONTINUED | OUTPATIENT
Start: 2022-02-06 | End: 2022-02-07 | Stop reason: HOSPADM

## 2022-02-06 RX ADMIN — ENOXAPARIN SODIUM 40 MG: 100 INJECTION SUBCUTANEOUS at 08:52

## 2022-02-06 RX ADMIN — SODIUM CHLORIDE: 9 INJECTION, SOLUTION INTRAVENOUS at 04:48

## 2022-02-06 RX ADMIN — ACETAMINOPHEN 650 MG: 325 TABLET ORAL at 18:46

## 2022-02-06 RX ADMIN — SODIUM CHLORIDE 10 ML: 9 INJECTION INTRAMUSCULAR; INTRAVENOUS; SUBCUTANEOUS at 08:52

## 2022-02-06 RX ADMIN — SODIUM CHLORIDE: 9 INJECTION, SOLUTION INTRAVENOUS at 18:38

## 2022-02-06 RX ADMIN — PANTOPRAZOLE SODIUM 40 MG: 40 INJECTION, POWDER, FOR SOLUTION INTRAVENOUS at 08:52

## 2022-02-06 ASSESSMENT — PAIN DESCRIPTION - PAIN TYPE: TYPE: ACUTE PAIN

## 2022-02-06 ASSESSMENT — PAIN SCALES - GENERAL: PAINLEVEL_OUTOF10: 5

## 2022-02-06 ASSESSMENT — PAIN DESCRIPTION - ORIENTATION: ORIENTATION: ANTERIOR

## 2022-02-06 ASSESSMENT — PAIN DESCRIPTION - LOCATION: LOCATION: HEAD

## 2022-02-06 NOTE — H&P
Dovie Shone M.D. Internal Medicine History and Physical     Patient: Franklin Birmingham  Date of Admission: 2/5/2022  2:13 PM  Date of Evaluation: 2/6/2022      Subjective:      Chief Complaint:    Chief Complaint   Patient presents with    Chest Pain     started 3 hours ago, lower chest, epigastric, taking Tums without relief    Abdominal Pain       History Obtained From:  patient, electronic medical record  PCP: Lisa Lira MD    History of Present Illness:   Franklin Birmingham is a 55 y.o. female who presented to the ER yesterday complaining of abd pain and chest pain. Symptoms began a few hours prior to presentation to the ER and came on very suddenly. She ran a mile yesterday morning and afterwards ate a [employer] roll with butter and a spoon of peanut butter. A little later in the morning she got up to help her daughter and had severe epigastric pain radiating up into the lower chest.  Pain was so bad she laid down on the kitchen floor and curled up in the fetal position. Daughter called pt's  who brought her to ER. She denies fever and chills. She had some nausea but no emesis. Last BM was yesterday morning and was normal in caliber, but she has been passing flatus since then. In ER her pain was uncontrolled with fentanyl, but improved after NGT placed. This AM she feels much better. NGT was DC'd and she is tolerating clear liq diet without n/v or abd pain. She is ambulating up and down the hallway. Review of Systems:  Constitutional:negative  for fevers, and negative for chills.   Respiratory: negative for shortness of breath, negative for cough, and negative for wheezing  Cardiovascular: positive for chest pain, negative for palpitations, and negative for syncope  Gastrointestinal: positive for abdominal pain, positive for nausea,negative for vomiting, negative for diarrhea, negative for constipation, and negative for hematochezia or melena  Genitourinary: negative for dysuria, negative for urinary urgency, negative for urinary frequency, and negative for hematuria  Neurological: negative for unilateral weakness, numbness or tingling.     All other systems were reviewed with the patient and are negative except as stated above      History:      Past Medical History:        Diagnosis Date    Acute superficial venous thrombosis of lower extremity     POSTPARTUM    Arthritis     COVID-19 virus infection /  2021 2021    GERD (gastroesophageal reflux disease)     Goiter     Hx of blood clots     Superficial Right Leg    Postoperative nausea     Reflux esophagitis     Sleep apnea        Past Surgical History:        Procedure Laterality Date    COLONOSCOPY N/A 10/13/2021    COLONOSCOPY POLYPECTOMY HOT BIOPSY performed by Catarino Del Toro MD at [facility] OF UTERUS N/A 2/28/2020    DILATATION AND CURETTAGE HYSTEROSCOPY CAUTERY ABLATION performed by Nuzhat Alonzo MD at [address] [facility]    Right 5th finger - tendon    HERNIA REPAIR  2010    HIATAL HERNIA REPAIR  10/29/2018    LAPAROSCOPIC ROBOTIC HIATAL HERNIA REPAIR WITH NISSEN FUNDOPLICATION    [place] LAP, REPAIR PARAESOPHAGEAL HERNIA, INCL FUNDOPLASTY W/ MESH N/A 10/29/2018    HERNIA HIATAL LAPAROSCOPIC ROBOTIC WITH NISSEN FUNDOPLICATION performed by Luciana Powell MD at [facility] 2/14/2018    EGD BIOPSY performed by Acosta Clifford MD at [address] (grade 2 reflux esophagitis)    UPPER GASTROINTESTINAL ENDOSCOPY  4/19/2018    ESOPHAGEAL CAPSULE ENDOSCOPY performed by Alhaji Rudolph MD at [address] [place] [address] N/A 4/19/2018    EGD BIOPSY performed by Alhaji Rudolph MD at [address] [place] [address]  12/19/2018    -gustavo's(duodenal-normal,antral-mild chronic inactive gastritis,esoph-mild active inflammation,gastric mucosa with mild chronic active inflammation;negative for intestinal metaplasia & dysplasia) intact fundoplicaiton,sm plaque like lesions in duodenum    UPPER GASTROINTESTINAL ENDOSCOPY N/A 12/19/2018    EGD BIOPSY performed by Leena Rod MD at [facility] 10/13/2021    EGD BIOPSY performed by Kris Cherry MD at [address] [facility] [address] Right [address]   [address]       Medications Prior to Admission:    Prior to Admission medications    Medication Sig Start Date End Date Taking? Authorizing Provider   pantoprazole (PROTONIX) 40 MG tablet Take 1 tablet by mouth daily 1/25/22  Yes Umm Lindsey MD   valACYclovir (VALTREX) 500 MG tablet Take 4 tablets by mouth 2 times daily For one day 1/25/22   Umm Lindsey MD   Calcium-Magnesium-Zinc 500-250-12.5 MG TABS Take 1 mg by mouth daily    Historical Provider, MD   Saccharomyces boulardii (PROBIOTIC) 250 MG CAPS Take 250 mg by mouth    Historical Provider, MD   polyethylene glycol (GLYCOLAX) 17 GM/SCOOP powder Follow instructions provided to you from physician's office. Patient not taking: Reported on 2/5/2022 8/31/21   Kris Cherry MD   celecoxib (CELEBREX) 200 MG capsule Take 1 capsule by mouth 2 times daily as needed for Pain  Patient not taking: Reported on 2/5/2022 2/25/20   Umm Lindsey MD       Allergies:  Patient has no known allergies. Social History:   TOBACCO:   reports that she has never smoked. She has never used smokeless tobacco.  ETOH:   reports current alcohol use. Family History:       Problem Relation Age of Onset    Heart Disease Mother         MVP    Hypertension Mother     Rheum Arthritis Father     Stroke Maternal Grandmother     Breast Cancer Paternal Grandmother     Other Other         No family h/o ovarian cancer.          Objective:    VITALS:  Temp: 97.7 °F (36.5 °C)  BP: 125/81  Resp: 16  Pulse: 82  SpO2: 97 %    Weight  Wt Readings from Last 3 Encounters:   02/05/22 135 lb 12.8 oz (61.6 kg)   01/25/22 136 lb (61.7 kg)   01/12/22 137 lb (62.1 kg)     Body mass index is 21.27 kg/m².  -----------------------------------------------------------------  EXAM:  GEN:    Awake, alert and oriented x3. EYES:  EOMI, pupils equal   NECK: Supple. No lymphadenopathy. No carotid bruit  CVS:    regular rate and rhythm, no audible murmur  PULM:  CTA, no wheezes, rales or rhonchi, no acute respiratory distress  ABD:    Bowels sounds normal.  Abdomen is soft. No distention. Mild generalized tenderness to palpation. Negative Suárez's sign  EXT:   no edema bilaterally . No calf tenderness. NEURO: Moves all extremities. Motor and sensory are grossly intact  SKIN:  No rashes.   No skin lesions.    -----------------------------------------------------------------    DATA:  CBC:   Lab Results   Component Value Date    WBC 9.0 02/06/2022    RBC 4.54 02/06/2022    HGB 14.3 02/06/2022    HCT 41.8 02/06/2022    MCV 92.1 02/06/2022     02/06/2022      CMP:   Lab Results   Component Value Date    GLUCOSE 90 02/06/2022    BUN 10 02/06/2022    CREATININE 0.78 02/06/2022     (L) 02/06/2022    K 4.1 02/06/2022    CALCIUM 8.6 02/06/2022     02/06/2022    CO2 25 02/06/2022    PROT 7.1 02/05/2022    LABALBU 4.7 02/05/2022    BILITOT 0.54 02/05/2022    ALKPHOS 57 02/05/2022    ALT 11 02/05/2022    AST 15 02/05/2022     UA:   Lab Results   Component Value Date    COLORU Yellow 02/05/2022    SPECGRAV 1.010 02/05/2022    WBCUA None 02/05/2022    RBCUA None 02/05/2022    EPITHUA None 02/05/2022    LEUKOCYTESUR NEGATIVE 02/05/2022    GLUCOSEU NEGATIVE 02/05/2022    KETUA 1+ (A) 02/05/2022    PROTEINU NEGATIVE 02/05/2022    HGBUR NEGATIVE 02/05/2022    CASTUA NOT REPORTED 02/05/2022    CRYSTUA NOT REPORTED 02/05/2022    BACTERIA NOT REPORTED 02/05/2022    YEAST NOT REPORTED 02/05/2022       EKG reviewed: my interpretation is: normal EKG, normal sinus rhythm        Imaging Data:  CT ABDOMEN PELVIS W IV CONTRAST Additional Contrast? None   Final Result   Findings suggestive of a small-bowel obstruction, possibly partial   originating in the ileum. RECOMMENDATIONS:   Surgical consultation         US ABDOMEN LIMITED Specify organ? GALLBLADDER    (Results Pending)   US ABDOMEN LIMITED Specify organ? GALLBLADDER    (Results Pending)         Assessment / Plan: This patient requires inpatient admission because of Epigastric / RUQ abdominal pain with CT findings suggestive of partial SBO  · Estimated length of stay is 3 days  · Discussed patient's symptoms and data results including labs and imaging studies with the ER MD at time of admission  · Gen Surg consult appreciated  · NGT DC'd  · RUQ US ordered - will be done tomorrow  · Advanced to clear liquids  · Chronic GERD  · S/p Nissen fundoplication  · Protonix IV until tolerating PO  · DVT prophylaxis: Lovenox  · High risk medications: none      Core Measures:     · Decubitus ulcer present on admission: No  · CODE STATUS: FULL CODE  · Nutrition Status: good   · Physical therapy: No   · Old Charts reviewed:  Yes  · EKG Reviewed: yes  · Advance Directive Addressed: Yes - in chart    Stephanie Godoy MD , M.D.  2/6/2022  1:18 PM

## 2022-02-06 NOTE — PROGRESS NOTES
Patient awake and in bed. Denies pain. Denies nausea. Abd is tender to the touch. No NG contents from suction at this time. VS and assessment completed. Abnormal labs reported to patient per request. VS read off as well. Patient up to the bathroom and back. Denies to sit up in the chair at this time. Patient back in bed. Patient brushing teeth. Denies any needs. Will continue to monitor.

## 2022-02-06 NOTE — PROGRESS NOTES
Patient stated she cleaned up by her self. Patient also given chicken broth, ginger ale, and hot tea. Denies any other needs. Will continue to monitor.

## 2022-02-06 NOTE — PROGRESS NOTES
Patient arrived to the floor at this time. Pt ambulated to bed, pt is alert and oriented. Pt is notably in a lot of pain. States the pain is in her abdomen and back. Patient made comfortable at this time. Vitals and assessment completed. Pt family at bedside. Navigator done. Call light within reach, will continue to monitor.

## 2022-02-06 NOTE — PROGRESS NOTES
Patient encouraged to get up and walk within the halls. Patient ambulated and tolerated well. Walking with patients mother.

## 2022-02-06 NOTE — PROGRESS NOTES
NG removed per Dr Esther Ocampo verbal order. Patient will have ultrasound of gallbladder tomorrow. Patient okay to have clear liquids today and then NPO at midnight. Denies any needs. Fresh ice water provided. Will continue to monitor.

## 2022-02-06 NOTE — PROGRESS NOTES
Writer in room for second assessment. Patient resting quietly in bed. Respirations even and unlabored. Patient stated she is not experience any pain at this moment. Vitals and second assessment completed at this time.

## 2022-02-06 NOTE — CONSULTS
Subjective:      Patient ID: Cee Omalley is a 55 y.o. female who presents today for:  Chief Complaint   Patient presents with    Chest Pain     started 3 hours ago, lower chest, epigastric, taking Tums without relief    Abdominal Pain       PAT Casarez presented to the emergency room yesterday with sudden onset of epigastric abdominal pain. The pain was associated with nausea but no vomiting. He denies abdominal distention or bloating. She did have a bowel movement yesterday. She has had no bowel movement since though she has been passing gas. The pain persisted until she received morphine in the emergency room. She denies recent fever. She denies previous history of similar type symptoms.     Past Medical History:   Diagnosis Date    Acute superficial venous thrombosis of lower extremity     POSTPARTUM    Arthritis     COVID-19 virus infection /  2021 2021    GERD (gastroesophageal reflux disease)     Goiter     Hx of blood clots     Superficial Right Leg    Postoperative nausea     Reflux esophagitis     Sleep apnea        Past Surgical History:   Procedure Laterality Date    COLONOSCOPY N/A 10/13/2021    COLONOSCOPY POLYPECTOMY HOT BIOPSY performed by Dez Zarate MD at [address] [facility] [address] N/A 2/28/2020    DILATATION AND CURETTAGE HYSTEROSCOPY CAUTERY ABLATION performed by Juvenal Meigs, MD at [address] [place] [address]    Right 5th finger - tendon    HERNIA REPAIR  2010    HIATAL HERNIA REPAIR  10/29/2018    LAPAROSCOPIC ROBOTIC HIATAL HERNIA REPAIR WITH NISSEN FUNDOPLICATION    DC LAP, REPAIR PARAESOPHAGEAL HERNIA, INCL FUNDOPLASTY W/ MESH N/A 10/29/2018    HERNIA HIATAL LAPAROSCOPIC ROBOTIC WITH NISSEN FUNDOPLICATION performed by Dwayne Angelo MD at [facility] 2/14/2018    EGD BIOPSY performed by Aaron Smith Alona Curtis MD at [address] (grade 2 reflux esophagitis)    UPPER GASTROINTESTINAL ENDOSCOPY  4/19/2018    ESOPHAGEAL CAPSULE ENDOSCOPY performed by Sera Farrell MD at [address] [facility] N/A 4/19/2018    EGD BIOPSY performed by Sera Farrell MD at [address] [facility] ENDOSCOPY  12/19/2018    -bx's(duodenal-normal,antral-mild chronic inactive gastritis,esoph-mild active inflammation,gastric mucosa with mild chronic active inflammation;negative for intestinal metaplasia & dysplasia) intact fundoplicaiton,sm plaque like lesions in duodenum    UPPER GASTROINTESTINAL ENDOSCOPY N/A 12/19/2018    EGD BIOPSY performed by Fortino Crigler, MD at [address] [facility] 10/13/2021    EGD BIOPSY performed by Conner Aviles MD at [address] [facility] 2006   [address] [facility]       No Known Allergies    No current facility-administered medications on file prior to encounter. Current Outpatient Medications on File Prior to Encounter   Medication Sig Dispense Refill    pantoprazole (PROTONIX) 40 MG tablet Take 1 tablet by mouth daily 90 tablet 3    valACYclovir (VALTREX) 500 MG tablet Take 4 tablets by mouth 2 times daily For one day 24 tablet 3    Calcium-Magnesium-Zinc 500-250-12.5 MG TABS Take 1 mg by mouth daily      Saccharomyces boulardii (PROBIOTIC) 250 MG CAPS Take 250 mg by mouth      polyethylene glycol (GLYCOLAX) 17 GM/SCOOP powder Follow instructions provided to you from physician's office.  (Patient not taking: Reported on 2/5/2022) 238 g 0    celecoxib (CELEBREX) 200 MG capsule Take 1 capsule by mouth 2 times daily as needed for Pain (Patient not taking: Reported on 2/5/2022) 180 capsule 1         Objective:   Physical Examination:    /81   Pulse 82   Temp 97.7 °F (36.5 °C) (Temporal)   Resp 16   Ht 5' 7\" (1.702 m)   Wt 135 lb 12.8 oz (61.6 kg)   SpO2 97%   BMI 21.27 kg/m²     Physical Exam  HENT:      Head: Normocephalic and atraumatic. Eyes:      Extraocular Movements: Extraocular movements intact. Pupils: Pupils are equal, round, and reactive to light. Cardiovascular:      Rate and Rhythm: Normal rate and regular rhythm. Pulses: Normal pulses. Heart sounds: Normal heart sounds. No murmur heard. No friction rub. No gallop. Pulmonary:      Effort: Pulmonary effort is normal.      Breath sounds: Normal breath sounds. Abdominal:      General: Abdomen is flat. Bowel sounds are normal.      Palpations: Abdomen is soft. Musculoskeletal:         General: No swelling. Normal range of motion. Cervical back: Normal range of motion and neck supple. Neurological:      General: No focal deficit present. Mental Status: She is oriented to person, place, and time. Psychiatric:         Mood and Affect: Mood normal.         Behavior: Behavior normal.     Abdomen is soft and nondistended. There is no focal tenderness presently. CT of the abdomen is reviewed as well as blood work. Assessment:     Patient Active Problem List   Diagnosis    Chronic GERD    Choking    S/P Nissen fundoplication (with gastrostomy tube placement) ([address]    Irritable bowel syndrome with both constipation and diarrhea    Gastroesophageal reflux disease    Hyperplastic polyp of ascending colon    Dysfunction of left eustachian tube  suspect early otitis media    Small bowel obstruction ([address]       I am not convinced the patient's symptoms are secondary to a bowel obstruction. Plan:   I believe we can remove the patient's nasogastric tube. I will order an ultrasound of the abdomen with attention to the gallbladder. Patient will be offered clear liquids after the ultrasound has been completed. I will follow along with you. Thank you for asking me to participate in the patient's care.           Efren Engle MD   2/6/2022 10:32 AM EST

## 2022-02-07 ENCOUNTER — APPOINTMENT (OUTPATIENT)
Dept: ULTRASOUND IMAGING | Age: 47
DRG: 390 | End: 2022-02-07
Payer: COMMERCIAL

## 2022-02-07 VITALS
SYSTOLIC BLOOD PRESSURE: 116 MMHG | WEIGHT: 134.1 LBS | HEIGHT: 67 IN | HEART RATE: 78 BPM | DIASTOLIC BLOOD PRESSURE: 78 MMHG | BODY MASS INDEX: 21.05 KG/M2 | RESPIRATION RATE: 16 BRPM | OXYGEN SATURATION: 97 % | TEMPERATURE: 97.9 F

## 2022-02-07 LAB
ABSOLUTE EOS #: 0.08 K/UL (ref 0–0.44)
ABSOLUTE IMMATURE GRANULOCYTE: <0.03 K/UL (ref 0–0.3)
ABSOLUTE LYMPH #: 1.36 K/UL (ref 1.1–3.7)
ABSOLUTE MONO #: 0.4 K/UL (ref 0.1–1.2)
ALBUMIN SERPL-MCNC: 3.4 G/DL (ref 3.5–5.2)
ALBUMIN/GLOBULIN RATIO: 1.7 (ref 1–2.5)
ALP BLD-CCNC: 33 U/L (ref 35–104)
ALT SERPL-CCNC: 8 U/L (ref 5–33)
ANION GAP SERPL CALCULATED.3IONS-SCNC: 9 MMOL/L (ref 9–17)
AST SERPL-CCNC: 12 U/L
BASOPHILS # BLD: 0 % (ref 0–2)
BASOPHILS ABSOLUTE: <0.03 K/UL (ref 0–0.2)
BILIRUB SERPL-MCNC: 0.73 MG/DL (ref 0.3–1.2)
BUN BLDV-MCNC: 7 MG/DL (ref 6–20)
BUN/CREAT BLD: 11 (ref 9–20)
CALCIUM SERPL-MCNC: 8.6 MG/DL (ref 8.6–10.4)
CHLORIDE BLD-SCNC: 108 MMOL/L (ref 98–107)
CO2: 23 MMOL/L (ref 20–31)
CREAT SERPL-MCNC: 0.62 MG/DL (ref 0.5–0.9)
DIFFERENTIAL TYPE: NORMAL
EOSINOPHILS RELATIVE PERCENT: 2 % (ref 1–4)
GFR AFRICAN AMERICAN: >60 ML/MIN
GFR NON-AFRICAN AMERICAN: >60 ML/MIN
GFR SERPL CREATININE-BSD FRML MDRD: ABNORMAL ML/MIN/{1.73_M2}
GFR SERPL CREATININE-BSD FRML MDRD: ABNORMAL ML/MIN/{1.73_M2}
GLUCOSE BLD-MCNC: 84 MG/DL (ref 70–99)
HCT VFR BLD CALC: 39.8 % (ref 36.3–47.1)
HEMOGLOBIN: 13.6 G/DL (ref 11.9–15.1)
IMMATURE GRANULOCYTES: 0 %
LYMPHOCYTES # BLD: 28 % (ref 24–43)
MCH RBC QN AUTO: 31.6 PG (ref 25.2–33.5)
MCHC RBC AUTO-ENTMCNC: 34.2 G/DL (ref 28.4–34.8)
MCV RBC AUTO: 92.3 FL (ref 82.6–102.9)
MONOCYTES # BLD: 8 % (ref 3–12)
NRBC AUTOMATED: 0 PER 100 WBC
PDW BLD-RTO: 12.2 % (ref 11.8–14.4)
PLATELET # BLD: 180 K/UL (ref 138–453)
PLATELET ESTIMATE: NORMAL
PMV BLD AUTO: 10.3 FL (ref 8.1–13.5)
POTASSIUM SERPL-SCNC: 3.8 MMOL/L (ref 3.7–5.3)
RBC # BLD: 4.31 M/UL (ref 3.95–5.11)
RBC # BLD: NORMAL 10*6/UL
SEG NEUTROPHILS: 62 % (ref 36–65)
SEGMENTED NEUTROPHILS ABSOLUTE COUNT: 2.99 K/UL (ref 1.5–8.1)
SODIUM BLD-SCNC: 140 MMOL/L (ref 135–144)
TOTAL PROTEIN: 5.4 G/DL (ref 6.4–8.3)
WBC # BLD: 4.9 K/UL (ref 3.5–11.3)
WBC # BLD: NORMAL 10*3/UL

## 2022-02-07 PROCEDURE — 80053 COMPREHEN METABOLIC PANEL: CPT

## 2022-02-07 PROCEDURE — 36415 COLL VENOUS BLD VENIPUNCTURE: CPT

## 2022-02-07 PROCEDURE — 6370000000 HC RX 637 (ALT 250 FOR IP): Performed by: INTERNAL MEDICINE

## 2022-02-07 PROCEDURE — 2580000003 HC RX 258: Performed by: INTERNAL MEDICINE

## 2022-02-07 PROCEDURE — 6360000002 HC RX W HCPCS: Performed by: INTERNAL MEDICINE

## 2022-02-07 PROCEDURE — 94761 N-INVAS EAR/PLS OXIMETRY MLT: CPT

## 2022-02-07 PROCEDURE — 76705 ECHO EXAM OF ABDOMEN: CPT

## 2022-02-07 PROCEDURE — 85025 COMPLETE CBC W/AUTO DIFF WBC: CPT

## 2022-02-07 RX ORDER — PANTOPRAZOLE SODIUM 40 MG/1
40 TABLET, DELAYED RELEASE ORAL
Status: DISCONTINUED | OUTPATIENT
Start: 2022-02-07 | End: 2022-02-07 | Stop reason: HOSPADM

## 2022-02-07 RX ORDER — POLYETHYLENE GLYCOL 3350 17 G/17G
17 POWDER, FOR SOLUTION ORAL 2 TIMES DAILY
Status: DISCONTINUED | OUTPATIENT
Start: 2022-02-07 | End: 2022-02-07 | Stop reason: HOSPADM

## 2022-02-07 RX ADMIN — POLYETHYLENE GLYCOL 3350 17 G: 17 POWDER, FOR SOLUTION ORAL at 08:07

## 2022-02-07 RX ADMIN — PANTOPRAZOLE SODIUM 40 MG: 40 TABLET, DELAYED RELEASE ORAL at 08:07

## 2022-02-07 RX ADMIN — SODIUM CHLORIDE, PRESERVATIVE FREE 10 ML: 5 INJECTION INTRAVENOUS at 08:08

## 2022-02-07 RX ADMIN — ENOXAPARIN SODIUM 40 MG: 100 INJECTION SUBCUTANEOUS at 08:07

## 2022-02-07 RX ADMIN — ACETAMINOPHEN 650 MG: 325 TABLET ORAL at 08:07

## 2022-02-07 ASSESSMENT — PAIN DESCRIPTION - DESCRIPTORS
DESCRIPTORS: DULL
DESCRIPTORS: ACHING;HEADACHE

## 2022-02-07 ASSESSMENT — PAIN DESCRIPTION - ONSET: ONSET: ON-GOING

## 2022-02-07 ASSESSMENT — PAIN DESCRIPTION - LOCATION
LOCATION: HEAD
LOCATION: HEAD

## 2022-02-07 ASSESSMENT — PAIN SCALES - GENERAL
PAINLEVEL_OUTOF10: 1
PAINLEVEL_OUTOF10: 3

## 2022-02-07 ASSESSMENT — PAIN DESCRIPTION - FREQUENCY: FREQUENCY: CONTINUOUS

## 2022-02-07 ASSESSMENT — PAIN DESCRIPTION - PAIN TYPE
TYPE: ACUTE PAIN
TYPE: ACUTE PAIN

## 2022-02-07 NOTE — PROGRESS NOTES
Discharge instructions given to pt and family. No questions, pt verbalized understanding all instructions. Pt given work excuse, per Smaeera Mcfadden, [facility] [employer], to return to work on 2/6/22. Pt aware of follow up appointment as listed on AVS. Pt d/c'd off unit at this time, ambulatory, with mother, to home. Belongings in hand. No issues noted.

## 2022-02-07 NOTE — PROGRESS NOTES
Patient discharged prior to [place] visit.     [address] [facility] [place]  2/7/2022

## 2022-02-07 NOTE — PROGRESS NOTES
Progress Note    SUBJECTIVE:    Patient seen for f/u of Small bowel obstruction ([address] She ambulating in the shin no acute distress. Denies pain. Denies nausea vomiting. Tolerating diet. She is passing gas. ROS:   Constitutional: negative  for fevers, and negative for chills. Respiratory: negative for shortness of breath, negative for cough, and negative for wheezing  Cardiovascular: negative for chest pain, and negative for palpitations  Gastrointestinal: negative for abdominal pain, negative for nausea,negative for vomiting, negative for diarrhea, and negative for constipation     All other systems were reviewed with the patient and are negative unless otherwise stated in HPI      OBJECTIVE:      Vitals:   Vitals:    02/07/22 0700   BP: 116/78   Pulse: 78   Resp: 16   Temp: 97.9 °F (36.6 °C)   SpO2: 97%     Weight: 134 lb 1.6 oz (60.8 kg)   Height: 5' 7\" (170.2 cm)     Weight  Wt Readings from Last 3 Encounters:   02/07/22 134 lb 1.6 oz (60.8 kg)   01/25/22 136 lb (61.7 kg)   01/12/22 137 lb (62.1 kg)     Body mass index is 21 kg/m². 24HR INTAKE/OUTPUT:      Intake/Output Summary (Last 24 hours) at 2/7/2022 0931  Last data filed at 2/7/2022 0424  Gross per 24 hour   Intake 2452.37 ml   Output --   Net 2452.37 ml     -----------------------------------------------------------------  Exam:    GEN:    Awake, alert and oriented x3. EYES:  EOMI, pupils equal   NECK: Supple. No lymphadenopathy. No carotid bruit  CVS:    regular rate and rhythm, no audible murmur  PULM:  CTA, no wheezes, rales or rhonchi, no acute respiratory distress  ABD:    Bowels sounds normal.  Abdomen is soft. No distention. no tenderness to palpation. EXT:   no edema bilaterally . No calf tenderness. NEURO: Moves all extremities. Motor and sensory are grossly intact  SKIN:  No rashes.   No skin lesions.    -----------------------------------------------------------------    Diagnostic Data:      Complete Blood Count:   Recent Labs     02/05/22  1425 02/06/22  0630 02/07/22  0605   WBC 9.3 9.0 4.9   RBC 5.15* 4.54 4.31   HGB 16.2* 14.3 13.6   HCT 46.5 41.8 39.8   MCV 90.3 92.1 92.3   MCH 31.5 31.5 31.6   MCHC 34.8 34.2 34.2   RDW 12.3 12.7 12.2    209 180   MPV 10.0 9.9 10.3        Last 3 Blood Glucose:   Recent Labs     02/05/22  1425 02/06/22  0630 02/07/22  0605   GLUCOSE 129* 90 84        Comprehensive Metabolic Profile:   Recent Labs     02/05/22  1425 02/06/22  0630 02/07/22  0605    134* 140   K 4.1 4.1 3.8    103 108*   CO2 27 25 23   BUN 12 10 7   CREATININE 0.79 0.78 0.62   GLUCOSE 129* 90 84   CALCIUM 10.1 8.6 8.6   PROT 7.1  --  5.4*   LABALBU 4.7  --  3.4*   BILITOT 0.54  --  0.73   ALKPHOS 57  --  33*   AST 15  --  12   ALT 11  --  8        Urinalysis:   Lab Results   Component Value Date    NITRU NEGATIVE 02/05/2022    COLORU Yellow 02/05/2022    PHUR 5.0 02/05/2022    WBCUA None 02/05/2022    RBCUA None 02/05/2022    MUCUS NOT REPORTED 02/05/2022    TRICHOMONAS NOT REPORTED 02/05/2022    YEAST NOT REPORTED 02/05/2022    BACTERIA NOT REPORTED 02/05/2022    CLARITYU clear 02/19/2015    SPECGRAV 1.010 02/05/2022    LEUKOCYTESUR NEGATIVE 02/05/2022    UROBILINOGEN Normal 02/05/2022    BILIRUBINUR NEGATIVE 02/05/2022    BILIRUBINUR NEGATIVE 11/12/2018    BLOODU LARGE 11/12/2018    GLUCOSEU NEGATIVE 02/05/2022    KETUA 1+ 02/05/2022    AMORPHOUS NOT REPORTED 02/05/2022       HgBA1c:  No results found for: LABA1C    Lactic Acid: No results found for: LACTA     Troponin: No results for input(s): TROPONINI in the last 72 hours. CRP:  No results for input(s): CRP in the last 72 hours. Radiology/Imaging:  US ABDOMEN LIMITED Specify organ? GALLBLADDER   Final Result   Negative right upper quadrant ultrasound. CT ABDOMEN PELVIS W IV CONTRAST Additional Contrast? None   Final Result   Findings suggestive of a small-bowel obstruction, possibly partial   originating in the ileum.       RECOMMENDATIONS: Surgical consultation               ASSESSMENT / PLAN:  Small bowel obstruction (HCC)  · Continue current therapy   · Increase to full liquid diet  · IV lock  · Ambulate  · Nutrition status:   · Well developed, well nourished with no malnutrition  · Dietician consult initiated  · Hospital Prophylaxis:   · DVT: Lovenox   · Stress Ulcer: H2 Blocker   · High risk medications: none   · Disposition:    · Discharge plan is home today      LOPEZ Jiang - CNP , LOPEZ, NP-C  Hospitalist Medicine        2/7/2022, 9:31 AM

## 2022-02-07 NOTE — PROGRESS NOTES
Pt laying in bed with family at bedside. Initial shift assessment done and VS obtained as charted. Pt is A&Ox4. Pt currently complains of headache rated \"5\" on a 0-10 pain scale, prn Tylenol administered. Pt denies any abdominal pain at this time. New IV fluid bag started. Pt denies any other needs at this time. Call light and bedside within reach. Will monitor.

## 2022-02-07 NOTE — PROGRESS NOTES
02/05/22  1425 02/05/22  1425 02/06/22  0630 02/07/22  0605     --  134* 140   K 4.1  --  4.1 3.8     --  103 108*   CO2 27  --  25 23   BUN 12  --  10 7   CREATININE 0.79  --  0.78 0.62   GLUCOSE 129*  --  90 84   ALT 11  --   --  8   ALKPHOS 57  --   --  33*   GFR              < >                          < > = values in this interval not displayed. Lab Results   Component Value Date    LABALBU 3.4 02/07/2022      Nutrition Monitoring and Evaluation:   Behavioral-Environmental Outcomes:  None Identified   Food/Nutrient Intake Outcomes:  Diet Advancement/Tolerance  Physical Signs/Symptoms Outcomes:  Biochemical Data,Weight,GI Status     Discharge Planning:     Too soon to determine     Electronically signed by Federico Easton RD, LD on 2/7/22 at 8:34 AM EST    Contact: [phone]

## 2022-02-07 NOTE — DISCHARGE SUMMARY
Discharge Summary    Monica Valencia  :  1975  MRN:  [id number]    Admit date:  2022      Discharge date: 2022     Admitting Physician:  Jeff Walker MD    Discharge Diagnoses:    Principal Problem:    Small bowel obstruction [facility])  Active Problems:    Chronic GERD    S/P Nissen fundoplication (with gastrostomy tube placement) ([facility]  Resolved Problems:    * No resolved hospital problems. *      Hospital Course:   Monica Valencia is a 55 y.o. female admitted with small bowel obstruction. She presented to the emergency room with complaints of abdominal pain and chest pain. Symptom onset was a few hours prior to ER arrival. Patient stated she had sudden onset. She stated she ran a mile in the morning and afterwards ate at [employer] and ate a role with butter and a spoon of peanut butter. She stated later that morning she got up to help her daughter and developed severe epigastric pain radiating into her lower chest. Patient stated it was so bad that she laid on the kitchen floor and curled up in fetal position. Her daughter at that time called her  who brought her to the ER. Patient denied any recent illness including fever chills. Patient complained of nausea without emesis. Last BM was 1 day prior to arrival and was normal for her. Patient stated she had been passing flatus since that time. In the emergency room her pain was uncontrolled with fentanyl but improved after NG tube was placed. The following day patient symptoms were better and she became pain-free. Her NG was removed and she was tolerating clear liquid diet. She denied any further nausea or vomiting. Patient ambulated often in the shin. Diet was increased to full liquids and she tolerated well. Right upper quadrant ultrasound was completed and was negative. Patient will be discharged home today she will follow-up with her primary care as an outpatient.     Consultants:  Dr. Doron Kendrick, general surgery    Procedures: none    Complications: none    Discharge Condition: fair    Exam:  GEN:  alert and oriented to person, place and time, well-developed and well-nourished, in no acute distress  EYES: No gross abnormalities. , PERRL and EOMI  NECK: normal, supple, no lymphadenopathy,  no carotid bruits  PULM: clear to auscultation bilaterally- no wheezes, rales or rhonchi, normal air movement, no respiratory distress  COR: regular rate & rhythm, no murmurs, no gallops, S1 normal and S2 normal  ABD:  soft, non-tender, non-distended, normal bowel sounds, no masses or organomegaly  EXT:   no cyanosis, clubbing or edema present    NEURO: follows commands, [place], no deficits  SKIN:  no rashes or significant lesions    Significant Diagnostic Studies:   Lab Results   Component Value Date    WBC 4.9 02/07/2022    HGB 13.6 02/07/2022     02/07/2022       Lab Results   Component Value Date    BUN 7 02/07/2022    CREATININE 0.62 02/07/2022     02/07/2022    K 3.8 02/07/2022    CALCIUM 8.6 02/07/2022     (H) 02/07/2022    CO2 23 02/07/2022    LABGLOM >60 02/07/2022       Lab Results   Component Value Date    WBCUA None 02/05/2022    RBCUA None 02/05/2022    EPITHUA None 02/05/2022    LEUKOCYTESUR NEGATIVE 02/05/2022    SPECGRAV 1.010 02/05/2022    GLUCOSEU NEGATIVE 02/05/2022    KETUA 1+ (A) 02/05/2022    PROTEINU NEGATIVE 02/05/2022    HGBUR NEGATIVE 02/05/2022    CASTUA NOT REPORTED 02/05/2022    CRYSTUA NOT REPORTED 02/05/2022    BACTERIA NOT REPORTED 02/05/2022    YEAST NOT REPORTED 02/05/2022       CT ABDOMEN PELVIS W IV CONTRAST Additional Contrast? None    Result Date: 2/5/2022  EXAMINATION: CT OF THE ABDOMEN AND PELVIS WITH CONTRAST 2/5/2022 12:53 pm TECHNIQUE: CT of the abdomen and pelvis was performed with the administration of intravenous contrast. Multiplanar reformatted images are provided for review.  Dose modulation, iterative reconstruction, and/or weight based adjustment of the mA/kV was utilized to reduce the Discharge Medications:         Medication List      CONTINUE taking these medications    Calcium-Magnesium-Zinc 500-250-12.5 MG Tabs     celecoxib 200 MG capsule  Commonly known as: CeleBREX  Take 1 capsule by mouth 2 times daily as needed for Pain     pantoprazole 40 MG tablet  Commonly known as: PROTONIX  Take 1 tablet by mouth daily     polyethylene glycol 17 GM/SCOOP powder  Commonly known as: GLYCOLAX  Follow instructions provided to you from physician's office. Probiotic 250 MG Caps     valACYclovir 500 MG tablet  Commonly known as: Valtrex  Take 4 tablets by mouth 2 times daily For one day            Patient Instructions:    Activity: activity as tolerated  Diet: clear liquids, advance as tolerated  Wound Care: none needed  Other: None    Disposition:   Discharge to Home    Follow up:  Patient will be followed by Todd Barthel, MD in 1-2 weeks    CORE MEASURES on Discharge (if applicable)  ACE/ARB in CHF: NA  Statin in MI: NA  ASA in [place]: NA  Statin in CVA: NA  Antiplatelet in CVA: NA    Total time spent on discharge services: 40 minutes    Including the following activities:  Evaluation and Management of patient  Discussion with patient and/or surrogate about current care plan  Coordination with Case Management and/or   Coordination of care with Consultants (if applicable)   Coordination of care with Receiving Facility Physician (if applicable)  Completion of DME forms (if applicable)  Preparation of Discharge Summary  Preparation of Medication Reconciliation  Preparation of Discharge Prescriptions    Signed:  LOPEZ Dumont CNP, LOPEZ, NP-C  2/7/2022, 9:33 AM

## 2022-02-07 NOTE — PROGRESS NOTES
Writer entered room to see if prn medication was effective. Pt is currently in bed with eyes closed.  at bedside and denies any needs at this time. Will continue to monitor.

## 2022-02-07 NOTE — PROGRESS NOTES
Pt laying in bed. VS rechecked and pt reassessed at this time. Pt remains A&Ox4. Pt states headache is minimal at this time. Pt denies any abdominal pain or tenderness. Pt denies any needs or concerns at this time. Call light remains within reach, will monitor.

## 2022-02-07 NOTE — PROGRESS NOTES
IV beeping. Writer entered room to plug in due to low battery. Pt awake in bed at this time, denies any needs. Call light and bedside table are within reach.

## 2022-02-07 NOTE — DISCHARGE INSTR - DIET
Good nutrition is important when healing from an illness, injury, or surgery. Follow any nutrition recommendations given to you during your hospital stay. If you were given an oral nutrition supplement while in the hospital, continue to take this supplement at home. You can take it with meals, in-between meals, and/or before bedtime. These supplements can be purchased at most local grocery stores, pharmacies, and chain [employer]-stores. If you have any questions about your diet or nutrition, call the hospital and ask for the dietitian.     Clear liquid diet, advance as tolerated

## 2022-02-08 ENCOUNTER — CARE COORDINATION (OUTPATIENT)
Dept: OTHER | Facility: CLINIC | Age: 47
End: 2022-02-08

## 2022-02-08 NOTE — CARE COORDINATION
Care Transitions Outreach Attempt    Call within 2 business days of discharge: Yes   Attempted to reach patient for transitions of care follow up. Unable to reach patient. Patient: Janie Hurt Patient : 1975 MRN: [id number]    Last Discharge [facility]       Complaint Diagnosis Description Type Department Provider    22 Chest Pain; Abdominal Pain Small bowel obstruction [facility]) ED to Hosp-Admission (Discharged) (ADMITTED) Luz Modi MD; Mann ROBBINS Eit. .. Was this an external facility discharge?  No Discharge Facility: [facility]    Noted following upcoming appointments from discharge chart review:   [facility] follow up appointment(s):   Future Appointments   Date Time Provider Akhil Blackwell   2022  2:30 MD Abiel Juarez   3/22/2022  8:30 AM MD Abiel Hare   2022  4:00 PM [facility]     Non-[facility] follow up appointment(s): n/a

## 2022-02-09 ENCOUNTER — CARE COORDINATION (OUTPATIENT)
Dept: OTHER | Facility: CLINIC | Age: 47
End: 2022-02-09

## 2022-02-09 NOTE — CARE COORDINATION
Transitions of Care Initial Call    Was this an external facility discharge? No Discharge Facility: [facility]     ACM received voicemail this morning from patient stating \"I don't need any help with transition of care, going back to work tomorrow and I will let you know if I need help with anything else. Thank you so much for calling\". Since CHACHO call was also completed by PCP office and hospital f/u appt was made ACM will sign off at this time. ACM provided contact information. No further follow-up call indicated based on severity of symptoms and risk factors.   Plan for next call: patient declined need for CHACHO calls

## 2022-05-12 PROBLEM — Z00.00 WELLNESS EXAMINATION: Status: RESOLVED | Noted: 2019-02-04 | Resolved: 2022-05-12

## 2022-06-16 LAB
CHOLESTEROL/HDL RATIO: 3.1
CHOLESTEROL: 205 MG/DL
GLUCOSE BLD-MCNC: 86 MG/DL (ref 70–99)
HDLC SERPL-MCNC: 67 MG/DL
LDL CHOLESTEROL: 122 MG/DL (ref 0–130)
PATIENT FASTING?: YES
TRIGL SERPL-MCNC: 82 MG/DL

## 2022-06-28 PROBLEM — R10.12 LEFT UPPER QUADRANT ABDOMINAL PAIN: Status: ACTIVE | Noted: 2022-06-28

## 2022-09-26 ENCOUNTER — HOSPITAL ENCOUNTER (OUTPATIENT)
Dept: GENERAL RADIOLOGY | Age: 47
Discharge: HOME | End: 2022-09-28
Payer: COMMERCIAL

## 2022-09-26 DIAGNOSIS — R10.12 LEFT UPPER QUADRANT ABDOMINAL PAIN: ICD-10-CM

## 2022-09-26 PROCEDURE — 74248 X-RAY SM INT F-THRU STD: CPT

## 2022-09-26 PROCEDURE — 6360000004 HC RX CONTRAST MEDICATION: Performed by: INTERNAL MEDICINE

## 2022-09-26 PROCEDURE — A4641 RADIOPHARM DX AGENT NOC: HCPCS | Performed by: INTERNAL MEDICINE

## 2022-09-26 RX ADMIN — BARIUM SULFATE 355 ML: 0.6 SUSPENSION ORAL at 12:27

## 2022-11-08 ENCOUNTER — OFFICE VISIT (OUTPATIENT)
Dept: GASTROENTEROLOGY | Age: 47
End: 2022-11-08

## 2022-11-08 VITALS
WEIGHT: 130 LBS | BODY MASS INDEX: 20.4 KG/M2 | DIASTOLIC BLOOD PRESSURE: 75 MMHG | TEMPERATURE: 97.8 F | HEART RATE: 86 BPM | HEIGHT: 67 IN | RESPIRATION RATE: 16 BRPM | SYSTOLIC BLOOD PRESSURE: 120 MMHG

## 2022-11-08 DIAGNOSIS — K58.9 IRRITABLE BOWEL SYNDROME, UNSPECIFIED TYPE: Primary | ICD-10-CM

## 2022-11-08 DIAGNOSIS — K21.9 GASTROESOPHAGEAL REFLUX DISEASE WITHOUT ESOPHAGITIS: ICD-10-CM

## 2022-11-08 RX ORDER — HYOSCYAMINE SULFATE EXTENDED-RELEASE 0.38 MG/1
375 TABLET ORAL EVERY 12 HOURS PRN
Qty: 60 TABLET | Refills: 3 | Status: SHIPPED | OUTPATIENT
Start: 2022-11-08

## 2022-11-08 NOTE — PROGRESS NOTES
[address]  [address]    Chief Complaint   Patient presents with    New Patient     LLQ pain, f/u small bowel obstruction-losing voice. At times chokes on food. Last EGD/Colonoscopy 10/2021. HPI  Leida Izaguirre is a  52year old woman with a past medical  history of acid reflux, sleep apnea who presents with a complaint of occasional abdominal discomfort. She states that she has had hernia repair surgery, Nissen fundoplication in 9467 and has abdominal wall pain as well as worsening reflux. She states that she has had a hoarse voice for the past week. She reports that her last EGD and colonoscopy in 10/2021 - following with up with Dr. Isaura Garcia who told her that it was likely stress and allergies. She states that she had an episode of small bowel obstruction in 02/2022, an NG tube was used to decompress her. She endorses regular use of pantoprazole. CT of abdomen with contrast 02/05/2022  Findings suggestive of a small-bowel obstruction, possibly partial   originating in the ileum. US of abdomen 02/07/2022     Negative right upper quadrant ultrasound. 1.  Unremarkable upper GI exam.       2.  Delayed transit of contrast through the small bowel with persistence of   material within closely associated clumped bowel loops in the pelvis from 1-4   hours after ingestion. No filling defects or mass lesions are noted. Possibilities would include adhesions. 3.  Distal ileum/ileocecal valve unremarkable.        Past Medical History:   Diagnosis Date    Acute superficial venous thrombosis of lower extremity     POSTPARTUM    Chronic GERD     COVID-19 virus infection /  2021 2021    Sleep apnea          Past Surgical History:   Procedure Laterality Date    COLONOSCOPY N/A 10/13/2021    COLONOSCOPY POLYPECTOMY HOT BIOPSY performed by Saadia Greene MD at [address] [facility] N/A 2/28/2020 DILATATION AND CURETTAGE HYSTEROSCOPY CAUTERY ABLATION performed by Jordyn Whelan MD at [address]    Right 5th finger - tendon    HERNIA REPAIR  2010    HIATAL HERNIA REPAIR  10/29/2018    LAPAROSCOPIC ROBOTIC HIATAL HERNIA REPAIR WITH NISSEN FUNDOPLICATION    MS LAP, REPAIR PARAESOPHAGEAL HERNIA, INCL FUNDOPLASTY W/ MESH N/A 10/29/2018    HERNIA HIATAL LAPAROSCOPIC ROBOTIC WITH NISSEN FUNDOPLICATION performed by Damaris Ormond, MD at [address] 2/14/2018    EGD BIOPSY performed by Connie Lee MD at [address] (grade 2 reflux esophagitis)    UPPER GASTROINTESTINAL ENDOSCOPY  4/19/2018    ESOPHAGEAL CAPSULE ENDOSCOPY [facility] N/A 4/19/2018    EGD BIOPSY performed by Maranda Ochoa MD at [address] [facility] ENDOSCOPY  12/19/2018    -gustavo's(duodenal-normal,antral-mild chronic inactive gastritis,esoph-mild active inflammation,gastric mucosa with mild chronic active inflammation;negative for intestinal metaplasia & dysplasia) intact fundoplicaiton,sm plaque like lesions in duodenum    UPPER GASTROINTESTINAL ENDOSCOPY N/A 12/19/2018    EGD BIOPSY performed by Connie Lee MD at [address] 10/13/2021    EGD BIOPSY performed by Mildred Lazo MD at [address] [facility] Right [address] [place] [address]         Current Outpatient Medications   Medication Sig Dispense Refill    valACYclovir (VALTREX) 500 MG tablet Take 4 tablets by mouth 2 times daily For one day 24 tablet 3    pantoprazole (PROTONIX) 40 MG tablet Take 1 tablet by mouth daily 90 tablet 3    celecoxib (CELEBREX) 200 MG capsule Take 1 capsule by mouth 2 times daily as needed for Pain 180 capsule 1     No current facility-administered medications for this visit.         Family History   Problem Relation Age of Onset    Heart Disease Mother         MVP    Hypertension Mother     Rheum Arthritis Father     Stroke Maternal Grandmother     Breast Cancer Paternal Grandmother     Other Other         No family h/o ovarian cancer.          Social Determinants of Health     Tobacco Use: Low Risk     Smoking Tobacco Use: Never    Smokeless Tobacco Use: Never    Passive Exposure: Not on file   Alcohol Use: Not on file   Financial Resource Strain: Low Risk     Difficulty of Paying Living Expenses: Not hard at all   Food Insecurity: No Food Insecurity    Worried About Running Out of Food in the Last Year: Never true    Ran Out of Food in the Last Year: Never true   Transportation Needs: No Transportation Needs    Lack of Transportation (Medical): No    Lack of Transportation (Non-Medical): No   Physical Activity: Not on file   Stress: Not on file   Social Connections: Not on file   Intimate Partner Violence: Not on file   Depression: Not at risk    PHQ-2 Score: 0   Housing Stability: Not on file       Review of Systems   Respiratory:  Positive for apnea.    Gastrointestinal:         GERD      /75 (Site: Left Upper Arm, Position: Sitting, Cuff Size: Medium Adult)   Pulse 86   Temp 97.8 °F (36.6 °C) (Temporal)   Resp 16   Ht 5' 7\" (1.702 m)   Wt 130 lb (59 kg)   BMI 20.36 kg/m²     Physical Exam  Constitutional:       Appearance: Normal appearance.   HENT:      Head: Normocephalic.      Right Ear: External ear normal.      Left Ear: External ear normal.      Nose: Nose normal.      Mouth/Throat:      Mouth: Mucous membranes are moist.   Eyes:      Extraocular Movements: Extraocular movements intact.      Pupils: Pupils are equal, round, and reactive to light.   Cardiovascular:      Rate and Rhythm: Normal rate and regular rhythm.      Pulses: Normal pulses.      Heart sounds: Normal heart sounds.   Pulmonary:      Effort: Pulmonary effort is normal.      Breath sounds: Normal  breath sounds. Abdominal:      General: Bowel sounds are normal.      Palpations: Abdomen is soft. Comments: Tenderness with very mild touch of abdominal wall   Musculoskeletal:         General: Normal range of motion. Cervical back: Normal range of motion and neck supple. Skin:     General: Skin is warm. Neurological:      General: No focal deficit present. Mental Status: She is alert and oriented to person, place, and time. Psychiatric:         Mood and Affect: Mood normal.         Lab Results   Component Value Date    WBC 4.9 02/07/2022    HGB 13.6 02/07/2022    HCT 39.8 02/07/2022    MCV 92.3 02/07/2022     02/07/2022        Lab Results   Component Value Date     02/07/2022    K 3.8 02/07/2022     (H) 02/07/2022    CO2 23 02/07/2022    BUN 7 02/07/2022    CREATININE 0.62 02/07/2022    GLUCOSE 86 06/16/2022    CALCIUM 8.6 02/07/2022    PROT 5.4 (L) 02/07/2022    LABALBU 3.4 (L) 02/07/2022    BILITOT 0.73 02/07/2022    ALKPHOS 33 (L) 02/07/2022    AST 12 02/07/2022    ALT 8 02/07/2022    LABGLOM >60 02/07/2022    GFRAA >60 02/07/2022          Lab Results   Component Value Date    INR 1.0 10/29/2018    PROTIME 11.5 10/29/2018             Assessment    Leida Ureña is a  52year old woman with a past medical  history of acid reflux, sleep apnea who presents with a complaint of occasional abdominal discomfort. Of note, she has a history of small bowel obstruction - per CT scan from 02/2022 which has since resolved. Her bowel sounds were normal on abdominal examination, although her tenderness was out of proportion to examination. Her symptoms at this time appear consistent with IBS along with pantoprazole managed reflux for which she could benefit from lifestyle modifications. Plan      1. Abdominal wall pain: Irritable bowel syndrome, unspecified type  - hyoscyamine (LEVBID) 375 MCG extended release tablet;  Take 1 tablet by mouth every 12 hours as needed for Cramping Dispense: 60 tablet; Refill: 3    2. GERD:   GERD pamphlet provided: Risk factors for worsening GERD including hiatal hernia, weight gain, medication noncompliance, fatty foods, coffee, alcohol, tomato-based foods, spicy foods, citrus, carbonated drinks, NSAID use, and eating just before bedtime are all known to cause a worsening of acid reflux despite use of proton pump inhibitors. These also represent modifiable risk factors of acid reflux. 3. F/U in 3 months    Informed consent was obtained with a discussion about potential risks and complications of the procedure. Patient verbalized understanding and willingness to continue with the procedure scheduling. Spent 20 minutes with the patient with greater than 50 percent of the time was spent on face-to-face time in discussion with the patient regarding diagnostic options/results, treatment options, counseling, and follow-up plan.       Brittney Coles MD

## 2022-11-08 NOTE — PATIENT INSTRUCTIONS
SURVEY:    You may be receiving a survey from [employer] regarding your visit today. Please complete the survey to enable us to provide the highest quality of care to you and your family. If you cannot score us a very good on any question, please call the office to discuss how we could have made your experience a very good one. Thank you.

## 2022-12-16 ENCOUNTER — HOSPITAL ENCOUNTER (OUTPATIENT)
Dept: WOMENS IMAGING | Age: 47
Discharge: HOME | End: 2022-12-16
Payer: COMMERCIAL

## 2022-12-16 DIAGNOSIS — Z12.31 BREAST CANCER SCREENING BY MAMMOGRAM: ICD-10-CM

## 2022-12-16 PROCEDURE — 77067 SCR MAMMO BI INCL CAD: CPT

## 2022-12-29 ENCOUNTER — HOSPITAL ENCOUNTER (OUTPATIENT)
Dept: MAMMOGRAPHY | Age: 47
Discharge: HOME | End: 2022-12-31
Payer: COMMERCIAL

## 2022-12-29 DIAGNOSIS — R92.8 ABNORMAL MAMMOGRAM OF RIGHT BREAST: ICD-10-CM

## 2022-12-29 PROCEDURE — G0279 TOMOSYNTHESIS, MAMMO: HCPCS

## 2023-01-31 ENCOUNTER — HOSPITAL ENCOUNTER (OUTPATIENT)
Dept: MRI IMAGING | Age: 48
Discharge: HOME | End: 2023-02-02
Payer: COMMERCIAL

## 2023-01-31 DIAGNOSIS — R92.8 ABNORMAL MAMMOGRAM OF BOTH BREASTS: ICD-10-CM

## 2023-01-31 PROCEDURE — A9579 GAD-BASE MR CONTRAST NOS,1ML: HCPCS | Performed by: INTERNAL MEDICINE

## 2023-01-31 PROCEDURE — C8908 MRI W/O FOL W/CONT, BREAST,: HCPCS

## 2023-01-31 PROCEDURE — 6360000004 HC RX CONTRAST MEDICATION: Performed by: INTERNAL MEDICINE

## 2023-01-31 RX ADMIN — GADOTERIDOL 11 ML: 279.3 INJECTION, SOLUTION INTRAVENOUS at 12:41

## 2023-02-26 ASSESSMENT — ENCOUNTER SYMPTOMS: APNEA: 1

## 2023-04-18 PROBLEM — R10.12 LEFT UPPER QUADRANT ABDOMINAL PAIN: Status: RESOLVED | Noted: 2022-06-28 | Resolved: 2023-04-18

## 2023-04-18 PROBLEM — Z00.00 WELLNESS EXAMINATION: Status: ACTIVE | Noted: 2023-04-18

## 2023-05-18 PROBLEM — Z00.00 WELLNESS EXAMINATION: Status: RESOLVED | Noted: 2023-04-18 | Resolved: 2023-05-18

## 2023-09-12 ENCOUNTER — OFFICE VISIT (OUTPATIENT)
Dept: CARDIOLOGY | Age: 48
End: 2023-09-12
Payer: COMMERCIAL

## 2023-09-12 VITALS
WEIGHT: 139 LBS | OXYGEN SATURATION: 100 % | RESPIRATION RATE: 18 BRPM | HEART RATE: 63 BPM | DIASTOLIC BLOOD PRESSURE: 74 MMHG | HEIGHT: 67 IN | SYSTOLIC BLOOD PRESSURE: 109 MMHG | BODY MASS INDEX: 21.82 KG/M2

## 2023-09-12 DIAGNOSIS — R06.02 SOB (SHORTNESS OF BREATH): ICD-10-CM

## 2023-09-12 DIAGNOSIS — E78.2 MIXED HYPERLIPIDEMIA: ICD-10-CM

## 2023-09-12 DIAGNOSIS — R07.89 CHEST DISCOMFORT: ICD-10-CM

## 2023-09-12 DIAGNOSIS — Z82.49 FAMILY HISTORY OF PREMATURE CAD: ICD-10-CM

## 2023-09-12 DIAGNOSIS — R07.89 ATYPICAL CHEST PAIN: Primary | ICD-10-CM

## 2023-09-12 DIAGNOSIS — K21.9 CHRONIC GERD: Chronic | ICD-10-CM

## 2023-09-12 DIAGNOSIS — R94.31 ABNORMAL ECG: ICD-10-CM

## 2023-09-12 PROCEDURE — 93000 ELECTROCARDIOGRAM COMPLETE: CPT | Performed by: INTERNAL MEDICINE

## 2023-09-12 PROCEDURE — 99214 OFFICE O/P EST MOD 30 MIN: CPT | Performed by: INTERNAL MEDICINE

## 2023-09-12 NOTE — PROGRESS NOTES
Kurt Galeazzi am scribing for and in the presence of Kale Rodríguez MD, F.A.C.C..    Patient: Tereza Alvarado  : 1975  Date of Visit: 2023    REASON FOR VISIT / CONSULTATION: Establish Cardiologist (Establish care for CP at rest, started 2 months ago. Feels like a flutter. Does have heartburn and knows the difference. Occasional SOB with stairs. //Denies: Lightheaded/dizziness. )      History of Present Illness:        Dear Jessica Reyna MD    I had the pleasure of seeing Tereza Alvarado today. Ms. Leah Pena is a 52 y.o. female  with a history of intermittent palpitations. She does have heartburn and takes her Protonix faithfully. She has history of fundoplication. No diabetes or thyroid disease. She does have hyperlipidemia and was started on Crestor few months ago. She has never been a smoker. She does drink a lot of caffeine but has tried to cut back. Family history includes her mother in her 52's mitral valve prolapse, stents and repair and father. EKG completed today in office on 2023: normal sinus rhythm, no acute ischemic changes. Q waves in V1 and V2 are nonspecific and does not indicate anteroseptal infarction. Ms. Leah Pena is here today to establish care. She reports about 2 months ago, she started feeling fluttering in her chest. It really isn't a pain but there is discomfort. It happens when she is relaxing and doesn't notice it much with any activity. No cold sweats. It doesn't radiate anywhere. She does feel palpitations when she drinks too much caffeine. No changes in her weight or appetite. She drinks plenty of water throughout the day to stay hydrated. She doesn't eat any fast food but enjoys her sweets. She does not sleep well at night due to her arthritis. She has to take Tylenol to help with this. She used to be very in to sports when in highschool and blames her arthritis on this. She denies having any lightheaded/dizziness.  No

## 2023-09-12 NOTE — PATIENT INSTRUCTIONS
SURVEY:    You may be receiving a survey from [employer] regarding your visit today. Please complete the survey to enable us to provide the highest quality of care to you and your family. If you cannot score us a very good on any question, please call the office to discuss how we could have made your experience a very good one. Thank you.

## 2023-09-13 DIAGNOSIS — R07.89 ATYPICAL CHEST PAIN: Primary | ICD-10-CM

## 2023-09-26 ENCOUNTER — HOSPITAL ENCOUNTER (OUTPATIENT)
Age: 48
Discharge: HOME | End: 2023-09-28
Payer: COMMERCIAL

## 2023-09-26 VITALS
SYSTOLIC BLOOD PRESSURE: 108 MMHG | BODY MASS INDEX: 21.97 KG/M2 | WEIGHT: 140 LBS | HEIGHT: 67 IN | HEART RATE: 61 BPM | DIASTOLIC BLOOD PRESSURE: 62 MMHG

## 2023-09-26 DIAGNOSIS — R07.89 ATYPICAL CHEST PAIN: ICD-10-CM

## 2023-09-26 LAB
ECHO BSA: 1.73 M2
STRESS BASELINE DIAS BP: 62 MMHG
STRESS BASELINE HR: 88 BPM
STRESS BASELINE SYS BP: 108 MMHG
STRESS ESTIMATED WORKLOAD: 12.3 METS
STRESS EXERCISE DUR MIN: 10 MIN
STRESS EXERCISE DUR SEC: 28 SEC
STRESS PEAK DIAS BP: 80 MMHG
STRESS PEAK SYS BP: 142 MMHG
STRESS PERCENT HR ACHIEVED: 100 %
STRESS POST PEAK HR: 173 BPM
STRESS RATE PRESSURE PRODUCT: NORMAL BPM*MMHG
STRESS ST DEPRESSION: 0.4 MM
STRESS STAGE 1 BP: NORMAL MMHG
STRESS STAGE 1 DURATION: 3 MIN:SEC
STRESS STAGE 1 HR: 100 BPM
STRESS STAGE 2 BP: NORMAL MMHG
STRESS STAGE 2 DURATION: 6 MIN:SEC
STRESS STAGE 2 HR: 127 BPM
STRESS STAGE 3 DURATION: 9 MIN:SEC
STRESS STAGE 3 HR: 153 BPM
STRESS STAGE RECOVERY 1 BP: NORMAL MMHG
STRESS STAGE RECOVERY 1 DURATION: 0 MIN:SEC
STRESS STAGE RECOVERY 1 HR: 173 BPM
STRESS STAGE RECOVERY 2 DURATION: 1 MIN:SEC
STRESS STAGE RECOVERY 2 HR: 129 BPM
STRESS STAGE RECOVERY 3 BP: NORMAL MMHG
STRESS STAGE RECOVERY 3 DURATION: 3 MIN:SEC
STRESS STAGE RECOVERY 3 HR: 108 BPM
STRESS STAGE RECOVERY 4 BP: NORMAL MMHG
STRESS STAGE RECOVERY 4 DURATION: 5 MIN:SEC
STRESS STAGE RECOVERY 4 HR: 106 BPM
STRESS TARGET HR: 173 BPM

## 2023-09-26 PROCEDURE — 93016 CV STRESS TEST SUPVJ ONLY: CPT | Performed by: FAMILY MEDICINE

## 2023-09-26 PROCEDURE — 93017 CV STRESS TEST TRACING ONLY: CPT

## 2023-09-26 PROCEDURE — 93350 STRESS TTE ONLY: CPT | Performed by: FAMILY MEDICINE

## 2023-09-26 PROCEDURE — 93018 CV STRESS TEST I&R ONLY: CPT | Performed by: FAMILY MEDICINE

## 2023-09-27 ENCOUNTER — TELEPHONE (OUTPATIENT)
Dept: CARDIOLOGY | Age: 48
End: 2023-09-27

## 2023-09-27 NOTE — TELEPHONE ENCOUNTER
----- Message from Elizabeth Caicedo MD sent at 9/27/2023  2:15 PM EDT -----  Test result normal.  No further action needed.

## 2023-10-02 ENCOUNTER — HOSPITAL ENCOUNTER (OUTPATIENT)
Dept: ULTRASOUND IMAGING | Age: 48
End: 2023-10-02
Payer: COMMERCIAL

## 2023-10-02 ENCOUNTER — HOSPITAL ENCOUNTER (OUTPATIENT)
Dept: WOMENS IMAGING | Age: 48
Discharge: HOME | End: 2023-10-04
Payer: COMMERCIAL

## 2023-10-02 VITALS — HEIGHT: 67 IN | BODY MASS INDEX: 21.97 KG/M2 | WEIGHT: 140 LBS

## 2023-10-02 DIAGNOSIS — R92.8 ABNORMAL MAMMOGRAM OF BOTH BREASTS: ICD-10-CM

## 2023-10-02 PROCEDURE — G0279 TOMOSYNTHESIS, MAMMO: HCPCS

## 2024-03-30 NOTE — TELEPHONE ENCOUNTER
[place] paperwork was received. Blank copy of forms has been scanned. Patient notified it will take up to 3-5 business days for completion. 29-Mar-2024 17:07

## 2024-04-23 PROBLEM — K63.5 HYPERPLASTIC POLYP OF ASCENDING COLON: Status: RESOLVED | Noted: 2021-11-22 | Resolved: 2024-04-23

## 2024-04-23 PROBLEM — K56.609 SMALL BOWEL OBSTRUCTION (HCC): Status: RESOLVED | Noted: 2022-02-05 | Resolved: 2024-04-23

## 2024-04-23 PROBLEM — Z98.890 HISTORY OF NISSEN FUNDOPLICATION: Status: ACTIVE | Noted: 2021-08-31

## 2024-05-22 LAB
CHOLEST SERPL-MCNC: 153 MG/DL (ref 0–199)
CHOLESTEROL/HDL RATIO: 2
GLUCOSE SERPL-MCNC: 85 MG/DL (ref 70–99)
HDLC SERPL-MCNC: 74 MG/DL
LDLC SERPL CALC-MCNC: 65 MG/DL (ref 0–100)
TRIGL SERPL-MCNC: 70 MG/DL
VLDLC SERPL CALC-MCNC: 14 MG/DL

## 2024-10-30 ENCOUNTER — HOSPITAL ENCOUNTER (OUTPATIENT)
Dept: WOMENS IMAGING | Age: 49
Discharge: HOME | End: 2024-11-01
Payer: COMMERCIAL

## 2024-10-30 VITALS — BODY MASS INDEX: 21.19 KG/M2 | HEIGHT: 67 IN | WEIGHT: 135 LBS

## 2024-10-30 DIAGNOSIS — Z12.31 BREAST CANCER SCREENING BY MAMMOGRAM: ICD-10-CM

## 2024-10-30 PROCEDURE — 77063 BREAST TOMOSYNTHESIS BI: CPT

## (undated) DEVICE — GARMENT,MEDLINE,DVT,INT,CALF,MED, GEN2: Brand: MEDLINE

## (undated) DEVICE — KENDALL SCD EXPRESS SLEEVES, KNEE LENGTH, MEDIUM: Brand: KENDALL SCD

## (undated) DEVICE — SOLUTION IV 1000ML 0.9% SOD CHL FOR IRRIG PLAS CONT

## (undated) DEVICE — SUTURE MCRYL SZ 4-0 L18IN ABSRB UD L16MM PC-3 3/8 CIR PRIM Y845G

## (undated) DEVICE — SUTURE ETHBND EXCEL SZ 0 L30IN NONABSORBABLE GRN L26MM CT-2 X412H

## (undated) DEVICE — SKIN AFFIX SURG ADHESIVE 72/CS 0.55ML: Brand: MEDLINE

## (undated) DEVICE — PEN: MARKING STD 100/CS: Brand: MEDICAL ACTION INDUSTRIES

## (undated) DEVICE — PAD N ADH W3XL4IN POLY COT SFT PERF FLM EASILY CUT ABSRB

## (undated) DEVICE — GLOVE ORANGE PI 8   MSG9080

## (undated) DEVICE — FORCEPS BX L240CM WRK CHN 2.8MM STD CAP W/ NDL MIC MESH

## (undated) DEVICE — Z DISCONTINUED NO SUB IDED CAPSULE PH GASTROENTEROLOGY ES MON FOR REFLX DEL SYS BRAVO

## (undated) DEVICE — Y-TYPE TUR/BLADDER IRRIGATION SET, REGULATING CLAMP

## (undated) DEVICE — MEDI-VAC NON-CONDUCTIVE TUBING7MM X 30.5 (100FT): Brand: CARDINAL HEALTH

## (undated) DEVICE — SINGLE-USE POLYPECTOMY SNARE: Brand: CAPTIVATOR

## (undated) DEVICE — Device

## (undated) DEVICE — TROCAR: Brand: KII FIOS FIRST ENTRY

## (undated) DEVICE — GLOVE EXAM SM L95IN FNGR THK35MIL PALM THK24MIL OFF WHT

## (undated) DEVICE — CUP MED 1OZ CLR POLYPR FEED GRAD W/O LID

## (undated) DEVICE — CHLORAPREP 26ML ORANGE

## (undated) DEVICE — PACK,LITHOTOMY: Brand: MEDLINE

## (undated) DEVICE — GLOVE SURG SZ 65 THK91MIL LTX FREE SYN POLYISOPRENE

## (undated) DEVICE — JELLY,LUBE,STERILE,FLIP TOP,TUBE,2-OZ: Brand: MEDLINE

## (undated) DEVICE — GOWN,AURORA,NONREINFORCED,LARGE: Brand: MEDLINE

## (undated) DEVICE — SOLUTION IV IRRIG POUR BRL 0.9% SODIUM CHL 2F7124

## (undated) DEVICE — BASIN EMSIS 700ML GRAPHITE PLAS KID SHP GRAD

## (undated) DEVICE — TUBING, SUCTION, 9/32" X 12', STRAIGHT: Brand: MEDLINE INDUSTRIES, INC.

## (undated) DEVICE — TIP COVER ACCESSORY

## (undated) DEVICE — CANNULA ORAL NSL AD CO2 N INTUB O2 DEL DISP TRU LNK

## (undated) DEVICE — SHEARS ENDOSCP L36CM DIA5MM ULTRASONIC CRV TIP ADAPTIVE

## (undated) DEVICE — GAUZE,SPONGE,4"X4",16PLY,XRAY,STRL,LF: Brand: MEDLINE

## (undated) DEVICE — APPLIER CLP M L L11.4IN DIA10MM ENDOSCP ROT MULT FOR LIG

## (undated) DEVICE — PENROSE DRAIN 18 X .5" SILICONE: Brand: MEDLINE

## (undated) DEVICE — Z DISCONTINUED NO SUB IDED DEVICE ABLAT ENDOMET UTER SOUNDING ES SURESOUND NOVASURE

## (undated) DEVICE — SOLUTION SURG PREP ANTIMICROBIAL 4 OZ SKIN WND EXIDINE

## (undated) DEVICE — Device: Brand: DEFENDO VALVE AND CONNECTOR KIT

## (undated) DEVICE — CONMED DISPOSABLE GASTROINTESTINAL CYTOLOGY BRUSH, STRAIGHT HANDLE, 2.5 MM X 160 CM: Brand: CONMED

## (undated) DEVICE — PAD,ABDOMINAL,8"X10",ST,LF: Brand: MEDLINE

## (undated) DEVICE — ADAPTER TBNG LUER STUB 15 GA INTMED

## (undated) DEVICE — COVER,LIGHT HANDLE,FLX,2/PK: Brand: MEDLINE INDUSTRIES, INC.

## (undated) DEVICE — GLOVE SURG SZ 75 L12IN FNGR THK87MIL WHT LTX FREE

## (undated) DEVICE — SYRINGE MED 50ML LUERLOCK TIP

## (undated) DEVICE — ARM DRAPE

## (undated) DEVICE — INSUFFLATION TUBING SET WITH FILTER, FUNNEL CONNECTOR AND LUER LOCK: Brand: JOSNOE MEDICAL INC

## (undated) DEVICE — GAUZE,SPONGE,4"X4",16PLY,STRL,LF,10/TRAY: Brand: MEDLINE

## (undated) DEVICE — GOWN,AURORA,NONRNF,XL,30/CS: Brand: MEDLINE

## (undated) DEVICE — Z DISCONTINUED BY MEDLINE USE 2711682 TRAY SKIN PREP DRY W/ PREM GLV

## (undated) DEVICE — CATHETER URETH 16FR L16IN RED RUB INTMIT ROB MOD BARDX

## (undated) DEVICE — CANNULA SEAL

## (undated) DEVICE — DISPOSABLE LAPAROSCOPIC CORDS, 1 PER POUCH: Brand: A&E MEDICAL / DISPOSABLE LAPAROSCOPIC CORDS

## (undated) DEVICE — MEDICINE CUP, GRADUATED, STER: Brand: MEDLINE

## (undated) DEVICE — GLOVE SURG 8 11.7IN BEAD CUF LIGHT BRN SENSICARE LTX FREE

## (undated) DEVICE — GLOVE ORANGE PI 7 1/2   MSG9075

## (undated) DEVICE — COVER LT HNDL BLU PLAS

## (undated) DEVICE — TRAP SURG QUAD PARABOLA SLOT DSGN SFTY SCRN TRAPEASE

## (undated) DEVICE — GOWN,AURORA,NON-REINFORCED,2XL: Brand: MEDLINE

## (undated) DEVICE — TOWEL,OR,DSP,ST,NATURAL,DLX,4/PK,20PK/CS: Brand: MEDLINE

## (undated) DEVICE — TUBING, SUCTION, 1/4" X 12', STRAIGHT: Brand: MEDLINE

## (undated) DEVICE — BLOCK BITE 60FR RUBBER ADLT DENTAL

## (undated) DEVICE — CO2 CANNULA,SUPERSOFT, ADLT,7'O2,7'CO2: Brand: MEDLINE

## (undated) DEVICE — PREP PAD BNS: Brand: CONVERTORS

## (undated) DEVICE — FORCEPS BX L240CM JAW DIA2.4MM ORNG L CAP W/ NDL DISP RAD

## (undated) DEVICE — SUTURE PERMAHAND SZ 0 L30IN NONABSORBABLE BLK L26MM SH 1/2 K834H

## (undated) DEVICE — BLADELESS OBTURATOR: Brand: WECK VISTA

## (undated) DEVICE — SOLUTION ANTIFOG VIS SYS CLEARIFY LAPSCP

## (undated) DEVICE — TOWEL,OR,DSP,ST,BLUE,STD,4/PK,20PK/CS: Brand: MEDLINE